# Patient Record
Sex: FEMALE | Race: WHITE | Employment: UNEMPLOYED | URBAN - METROPOLITAN AREA
[De-identification: names, ages, dates, MRNs, and addresses within clinical notes are randomized per-mention and may not be internally consistent; named-entity substitution may affect disease eponyms.]

---

## 2020-08-12 ENCOUNTER — TELEPHONE (OUTPATIENT)
Dept: ENT CLINIC | Age: 50
End: 2020-08-12

## 2021-02-13 ENCOUNTER — HOSPITAL ENCOUNTER (EMERGENCY)
Age: 51
Discharge: HOME | End: 2021-02-13
Payer: COMMERCIAL

## 2021-02-13 ENCOUNTER — APPOINTMENT (OUTPATIENT)
Dept: GENERAL RADIOLOGY | Age: 51
End: 2021-02-13
Payer: COMMERCIAL

## 2021-02-13 VITALS
RESPIRATION RATE: 14 BRPM | OXYGEN SATURATION: 100 % | TEMPERATURE: 98.7 F | DIASTOLIC BLOOD PRESSURE: 85 MMHG | HEIGHT: 66 IN | HEART RATE: 76 BPM | BODY MASS INDEX: 38.57 KG/M2 | WEIGHT: 240 LBS | SYSTOLIC BLOOD PRESSURE: 151 MMHG

## 2021-02-13 DIAGNOSIS — M25.471 RIGHT ANKLE SWELLING: Primary | ICD-10-CM

## 2021-02-13 LAB
A/G RATIO: 1.3 (ref 1.1–2.2)
ALBUMIN SERPL-MCNC: 4 G/DL (ref 3.4–5)
ALP BLD-CCNC: 124 U/L (ref 40–129)
ALT SERPL-CCNC: 33 U/L (ref 10–40)
ANION GAP SERPL CALCULATED.3IONS-SCNC: 7 MMOL/L (ref 3–16)
AST SERPL-CCNC: 30 U/L (ref 15–37)
BASOPHILS ABSOLUTE: 0 K/UL (ref 0–0.2)
BASOPHILS RELATIVE PERCENT: 0.3 %
BILIRUB SERPL-MCNC: 0.5 MG/DL (ref 0–1)
BUN BLDV-MCNC: 18 MG/DL (ref 7–20)
CALCIUM SERPL-MCNC: 9.2 MG/DL (ref 8.3–10.6)
CHLORIDE BLD-SCNC: 104 MMOL/L (ref 99–110)
CO2: 27 MMOL/L (ref 21–32)
CREAT SERPL-MCNC: <0.5 MG/DL (ref 0.6–1.1)
D DIMER: 282 NG/ML DDU (ref 0–229)
EOSINOPHILS ABSOLUTE: 0.3 K/UL (ref 0–0.6)
EOSINOPHILS RELATIVE PERCENT: 3.9 %
GFR AFRICAN AMERICAN: >60
GFR NON-AFRICAN AMERICAN: >60
GLOBULIN: 3.1 G/DL
GLUCOSE BLD-MCNC: 88 MG/DL (ref 70–99)
HCT VFR BLD CALC: 36.9 % (ref 36–48)
HEMOGLOBIN: 12.2 G/DL (ref 12–16)
LYMPHOCYTES ABSOLUTE: 1.4 K/UL (ref 1–5.1)
LYMPHOCYTES RELATIVE PERCENT: 20.2 %
MCH RBC QN AUTO: 27.4 PG (ref 26–34)
MCHC RBC AUTO-ENTMCNC: 33 G/DL (ref 31–36)
MCV RBC AUTO: 83.2 FL (ref 80–100)
MONOCYTES ABSOLUTE: 0.5 K/UL (ref 0–1.3)
MONOCYTES RELATIVE PERCENT: 6.9 %
NEUTROPHILS ABSOLUTE: 4.6 K/UL (ref 1.7–7.7)
NEUTROPHILS RELATIVE PERCENT: 68.7 %
PDW BLD-RTO: 14 % (ref 12.4–15.4)
PLATELET # BLD: 230 K/UL (ref 135–450)
PMV BLD AUTO: 8.1 FL (ref 5–10.5)
POTASSIUM SERPL-SCNC: 4 MMOL/L (ref 3.5–5.1)
RBC # BLD: 4.44 M/UL (ref 4–5.2)
SODIUM BLD-SCNC: 138 MMOL/L (ref 136–145)
TOTAL PROTEIN: 7.1 G/DL (ref 6.4–8.2)
WBC # BLD: 6.8 K/UL (ref 4–11)

## 2021-02-13 PROCEDURE — 80053 COMPREHEN METABOLIC PANEL: CPT

## 2021-02-13 PROCEDURE — 85379 FIBRIN DEGRADATION QUANT: CPT

## 2021-02-13 PROCEDURE — 85025 COMPLETE CBC W/AUTO DIFF WBC: CPT

## 2021-02-13 PROCEDURE — 73610 X-RAY EXAM OF ANKLE: CPT

## 2021-02-13 PROCEDURE — 99284 EMERGENCY DEPT VISIT MOD MDM: CPT

## 2021-02-13 PROCEDURE — 6370000000 HC RX 637 (ALT 250 FOR IP): Performed by: NURSE PRACTITIONER

## 2021-02-13 RX ADMIN — APIXABAN 10 MG: 5 TABLET, FILM COATED ORAL at 20:57

## 2021-02-13 ASSESSMENT — PAIN SCALES - GENERAL: PAINLEVEL_OUTOF10: 6

## 2021-02-15 NOTE — ED PROVIDER NOTES
[address] [place] [address]  ED  EMERGENCY DEPARTMENT ENCOUNTER      This patient was not seen and evaluated by the attending physician. Pt Name: Pankaj Berger  MRN: [id number]  Caitlyngfmaxi 1970  Date of evaluation: 2/13/2021  Provider: KENNA McintyreC  PCP: Referring Not In System (Inactive)      History provided by the patient. CHIEFCOMPLAINT:     Chief Complaint   Patient presents with    Foot Swelling     swelling in right leg and foot per pt; no known injury started yesterday       HISTORY OF PRESENT ILLNESS:      Pankaj Berger is a 45 y.o. female who presents to [address] [place] [address]  ED with complaints of right ankle and foot swelling. Patient states that she noticed it yesterday without injury. States that it is uncomfortable, denies any history of blood clots. She denies any other injuries or complaints, denies chest pain or difficulty breathing. She is here for further evaluation. LOCATION:right ankle  QUALITY:ache  SEVERITY:6  DURATION:yesterday  MODIFYING FACTORS:worse with movement. Nursing Notes were reviewed     REVIEW OF SYSTEMS:     Review of Systems  All systems, a total of 10, are reviewed and negative except for those that were just noted in history present illness.         PAST MEDICAL HISTORY:     Past Medical History:   Diagnosis Date    Arthritis     Asthma     Bronchitis chronic     Cervical cancer ([address]     Diabetes mellitus ([address]     Emphysema of lung ([address]     Fibromyalgia     Hypertension     Insomnia     Lung disease     PAD [facility]peripheral artery disease) [facility]     Pneumonia          SURGICAL HISTORY:      Past Surgical History:   Procedure Laterality Date    ANGIOPLASTY      BRONCHOSCOPY      CHOLECYSTECTOMY      FOOT SURGERY  2002    right foot    TUBAL LIGATION  2004         CURRENT MEDICATIONS:       Discharge Medication List as of 2/13/2021  8:45 PM      CONTINUE these medications which have NOT CHANGED    Details Weight   02/13/21 1734 02/13/21 1734 -- 02/13/21 1721 02/13/21 1734 02/13/21 1721 02/13/21 1734 02/13/21 1734   (!) 152/93 98.7 °F (37.1 °C)  98 17 99 % 5' 6\" (1.676 m) 240 lb (108.9 kg)       Physical Exam    CONSTITUTIONAL: Awake and alert. Cooperative. Well-developed. Well-nourished. Vitals:    02/13/21 1721 02/13/21 1734 02/13/21 1952 02/13/21 2103   BP:  (!) 152/93 (!) 188/90 (!) 151/85   Pulse: 98 81 84 76   Resp:  17 16 14   Temp:  98.7 °F (37.1 °C)     SpO2: 99% 96% 99% 100%   Weight:  240 lb (108.9 kg)     Height:  5' 6\" (1.676 m)       HENT: Normocephalic. Atraumatic. External ears normal, without discharge. TMs clear bilaterally. Nonasal discharge. Oropharynx clear, no erythema. Mucous membranes moist.  EYES: Conjunctiva non-injected, nolid abnormalities noted. No scleral icterus. PERRL. EOM's grossly intact. Anterior chambers clear. NECK: Supple. Normal ROM. No meningismus. No thyroid tenderness or swelling noted. CARDIOVASCULAR: RRR. No Murmer. No carotid bruits. PULMONARY/CHEST WALL: Effort normal. No tachypnea. Lungs clear to ausculation. ABDOMEN: Normal BS. Soft. Nondistended. No tenderness to palpation. No guarding. No hernias noted. No splenomegaly. Back: Spine is midline. No ecchymosis. No crepituson palpation. No obvious subluxation of vertebral column. No saddle anesthesia or evidence of cauda equina. /ANORECTAL: Not assessed  MUSKULOSKELETAL: There is unilateral swelling to the right ankle, its mildly tender without significant erythema or ecchymosis. 2+ dorsalis pedis and posterior tibial pulses are present. No significant right calf tenderness. Other extremities are atraumatic and nontender  SKIN: Warm and dry. NEUROLOGICAL:  GCS 15. CN II-XII grossly intact. Strength is 5/5 in all extremities and sensation is intact. PSYCHIATRIC: Normal affect, normal insight and judgement. Alert and oriented x 3.         DIAGNOSTIC RESULTS:     LABS:    Results for orders placed or performed during the hospital encounter of 02/13/21   CBC auto differential   Result Value Ref Range    WBC 6.8 4.0 - 11.0 K/uL    RBC 4.44 4.00 - 5.20 M/uL    Hemoglobin 12.2 12.0 - 16.0 g/dL    Hematocrit 36.9 36.0 - 48.0 %    MCV 83.2 80.0 - 100.0 fL    MCH 27.4 26.0 - 34.0 pg    MCHC 33.0 31.0 - 36.0 g/dL    RDW 14.0 12.4 - 15.4 %    Platelets 252 114 - 633 K/uL    MPV 8.1 5.0 - 10.5 fL    Neutrophils % 68.7 %    Lymphocytes % 20.2 %    Monocytes % 6.9 %    Eosinophils % 3.9 %    Basophils % 0.3 %    Neutrophils Absolute 4.6 1.7 - 7.7 K/uL    Lymphocytes Absolute 1.4 1.0 - 5.1 K/uL    Monocytes Absolute 0.5 0.0 - 1.3 K/uL    Eosinophils Absolute 0.3 0.0 - 0.6 K/uL    Basophils Absolute 0.0 0.0 - 0.2 K/uL   Comprehensive metabolic panel   Result Value Ref Range    Sodium 138 136 - 145 mmol/L    Potassium 4.0 3.5 - 5.1 mmol/L    Chloride 104 99 - 110 mmol/L    CO2 27 21 - 32 mmol/L    Anion Gap 7 3 - 16    Glucose 88 70 - 99 mg/dL    BUN 18 7 - 20 mg/dL    CREATININE <0.5 (L) 0.6 - 1.1 mg/dL    GFR Non-African American >60 >60    GFR African American >60 >60    Calcium 9.2 8.3 - 10.6 mg/dL    Total Protein 7.1 6.4 - 8.2 g/dL    Albumin 4.0 3.4 - 5.0 g/dL    Albumin/Globulin Ratio 1.3 1.1 - 2.2    Total Bilirubin 0.5 0.0 - 1.0 mg/dL    Alkaline Phosphatase 124 40 - 129 U/L    ALT 33 10 - 40 U/L    AST 30 15 - 37 U/L    Globulin 3.1 g/dL   D-dimer, quantitative   Result Value Ref Range    D-Dimer, Quant 282 (H) 0 - 229 ng/mL DDU         RADIOLOGY:  All x-ray studies are viewed/reviewed by me. Formal interpretations per the radiologist are as follows:      XR ANKLE RIGHT (MIN 3 VIEWS)   Final Result   No acute fracture or dislocation. EKG:  See EKG interpretation by an attending physician.       PROCEDURES:   N/A    CRITICAL CARE TIME:   N/A    CONSULTS:  None      EMERGENCY DEPARTMENT COURSE andDIFFERENTIAL DIAGNOSIS/MDM:   Vitals:    Vitals:    02/13/21 1721 02/13/21 1734 02/13/21 1952 02/13/21 2103 BP:  (!) 152/93 (!) 188/90 (!) 151/85   Pulse: 98 81 84 76   Resp:  17 16 14   Temp:  98.7 °F (37.1 °C)     SpO2: 99% 96% 99% 100%   Weight:  240 lb (108.9 kg)     Height:  5' 6\" (1.676 m)         Patient wasgiven the following medications:  Medications   apixaban (ELIQUIS) tablet 10 mg (10 mg Oral Given 2/13/21 2057)         Patient was evaluated independently by myself with the attending physician available for consultation. Patient presented to the emergency room today with complaints of right ankle pain. Patient pain is unilateral.  No known injury. Patient with no history of a blood clot although I did consider DVT given the unilateral nature. Patient D-dimer was elevated, I did start her on Eliquis, gave her an order for an outpatient ultrasound to evaluate for possible DVT. Patient had no chest pain or difficulty breathing. Patient was discharged home in good condition. Strict return precautions given. Patient laboratory studies, radiographic imaging, and assessment were all discussed with the patient and/orpatient family. There was shared decision-making between myself as well as the patient and/or their surrogate and we are all in agreement with discharge home. There was an opportunity for questions and all questions were answered tothe best of my ability and to the satisfaction of the patient and/or patient family. FINAL IMPRESSION:      1.  Right ankle swelling          DISPOSITION/PLAN:   DISPOSITION Decision To Discharge      PATIENT REFERRED TO:  [facility]  ED  [address] [facility] [address]  Go to   If symptoms worsen    [facility]) Pre-Services  [phone]  Call   For follow up      DISCHARGE MEDICATIONS:  Discharge Medication List as of 2/13/2021  8:45 PM      START taking these medications    Details   apixaban (ELIQUIS) 5 MG TABS tablet Take 2 tablets by mouth twice daily until you have your venous duplex, Disp-28 tablet, R-0Normal (Please note thatportions of this note were completed with a voice recognition program.  Efforts were made to edit the dictations, but occasionally words are mis-transcribed.)    KENNA Stapleton - CNP-C (electronicallysigned)        KENNA Stapleton CNP  02/15/21 5402

## 2021-02-16 ENCOUNTER — VIRTUAL VISIT (OUTPATIENT)
Dept: INTERNAL MEDICINE CLINIC | Age: 51
End: 2021-02-16
Payer: COMMERCIAL

## 2021-02-16 DIAGNOSIS — Z12.11 SCREEN FOR COLON CANCER: ICD-10-CM

## 2021-02-16 DIAGNOSIS — Z00.00 ANNUAL PHYSICAL EXAM: Primary | ICD-10-CM

## 2021-02-16 DIAGNOSIS — M79.604 LEG PAIN, RIGHT: ICD-10-CM

## 2021-02-16 DIAGNOSIS — R60.0 LEG EDEMA, RIGHT: ICD-10-CM

## 2021-02-16 DIAGNOSIS — Z12.31 ENCOUNTER FOR SCREENING MAMMOGRAM FOR MALIGNANT NEOPLASM OF BREAST: ICD-10-CM

## 2021-02-16 PROCEDURE — 99386 PREV VISIT NEW AGE 40-64: CPT | Performed by: INTERNAL MEDICINE

## 2021-02-16 PROCEDURE — 99203 OFFICE O/P NEW LOW 30 MIN: CPT | Performed by: INTERNAL MEDICINE

## 2021-02-16 ASSESSMENT — PATIENT HEALTH QUESTIONNAIRE - PHQ9
2. FEELING DOWN, DEPRESSED OR HOPELESS: 0
SUM OF ALL RESPONSES TO PHQ QUESTIONS 1-9: 0
SUM OF ALL RESPONSES TO PHQ9 QUESTIONS 1 & 2: 0

## 2021-02-16 NOTE — PROGRESS NOTES
[facility]  History and Physical  Evelin Rascon M.D. José Luis Key  YOB: 1970    Date of Service:  2/16/2021    Chief Complaint:   José Luis Key is a 45 y.o. female who presents for   Chief Complaint   Patient presents with   [address] [facility] ED Follow-up     ankle swelling 02/13/2021     Pursuant to the emergency declaration under the [address] [place] [address] [facility] authority and the [employer] Act, this Virtual  Video Visit was conducted, with patient's consent, to reduce the patient's risk of exposure to COVID-19 and provide continuity of care. Service is  provided through a video synchronous discussion virtually to substitute for in-person clinic visit with the patient being at home and Dr. Devika Shine being at home. HPI: Here for Annual Physical and Follow up. Right leg swelling for 3 days and went to the ER with only slight elevation of D dimer 282 and sent home with Eliquis but it was too expensive so she didn't pick it up. She was diagnose with COVID 19 on Jan 22 and have been having some mild ORTIZ since then. No doppler was done in the ER that day.     Lab Results   Component Value Date    LABA1C 5.1 03/08/2010     Lab Results   Component Value Date     02/13/2021    K 4.0 02/13/2021     02/13/2021    CO2 27 02/13/2021    BUN 18 02/13/2021    CREATININE <0.5 (L) 02/13/2021    GLUCOSE 88 02/13/2021    CALCIUM 9.2 02/13/2021     Lab Results   Component Value Date    CHOL 169 05/03/2011    TRIG 150 05/03/2011    HDL 43 05/03/2011    LDLCALC 96 05/03/2011     Lab Results   Component Value Date    ALT 33 02/13/2021    AST 30 02/13/2021     Lab Results   Component Value Date    TSH 1.22 05/03/2011    TSH 0.50 03/08/2010    T4FREE 0.96 05/03/2011    T4FREE 0.96 05/03/2011     Lab Results   Component Value Date    WBC 6.8 02/13/2021    HGB 12.2 02/13/2021 HCT 36.9 02/13/2021    MCV 83.2 02/13/2021     02/13/2021     No results found for: INR   No results found for: PSA   No results found for: LABURIC     Wt Readings from Last 3 Encounters:   02/13/21 240 lb (108.9 kg)   07/06/16 220 lb (99.8 kg)   06/30/16 240 lb (108.9 kg)     BP Readings from Last 3 Encounters:   02/13/21 (!) 151/85   07/06/16 157/89   06/30/16 (!) 160/105       Patient Active Problem List   Diagnosis    Insomnia    COPD (chronic obstructive pulmonary disease) (HCC)       Allergies   Allergen Reactions    Erythromycin Hives and Nausea And Vomiting     Fevers    Azithromycin Nausea And Vomiting     Fever    Flu Virus Vaccine      Get sick    Pneumovax [Pneumococcal Polysaccharide Vaccine]      Get real sick    Shellfish-Derived Products Swelling    Gabapentin Nausea And Vomiting     No outpatient medications have been marked as taking for the 2/16/21 encounter (Virtual Visit) with Victorine Merlin, MD.       Past Medical History:   Diagnosis Date    Arthritis     Asthma     Bronchitis chronic     Cervical cancer ([address]     Diabetes mellitus ([address]     Emphysema of lung ([address]     Fibromyalgia     Hypertension     Insomnia     Lung disease     PAD (peripheral artery disease) ([address]     Pneumonia      Past Surgical History:   Procedure Laterality Date    ANGIOPLASTY  2005    Normal    BRONCHOSCOPY  2012    pneumonia    CHOLECYSTECTOMY  2006    FOOT SURGERY Right 01/01/2002    remove neuroma    TUBAL LIGATION  01/01/2004    UPPER GASTROINTESTINAL ENDOSCOPY  2008    ?  for GERD     Family History   Problem Relation Age of Onset    Asthma Mother     Emphysema Mother     Diabetes Father     Heart Failure Father     Heart Attack Father     Cancer Maternal Grandmother         ovarian     Diabetes Maternal Grandmother     No Known Problems Sister     Diabetes Paternal Grandmother     Heart Attack Paternal Grandmother     Cancer Paternal Grandfather         lung Social History     Socioeconomic History    Marital status:      Spouse name: Not on file    Number of children: Not on file    Years of education: Not on file    Highest education level: Not on file   Occupational History    Not on file   Social Needs    Financial resource strain: Not on file    Food insecurity     Worry: Not on file     Inability: Not on file    Transportation needs     Medical: Not on file     Non-medical: Not on file   Tobacco Use    Smoking status: Former Smoker     Packs/day: 0.50     Types: Cigarettes     Quit date: 3/11/2013     Years since quittin.9    Smokeless tobacco: Never Used   Substance and Sexual Activity    Alcohol use: Yes     Comment: rarely    Drug use: No    Sexual activity: Not on file   Lifestyle    Physical activity     Days per week: Not on file     Minutes per session: Not on file    Stress: Not on file   Relationships    Social connections     Talks on phone: Not on file     Gets together: Not on file     Attends Church service: Not on file     Active member of club or organization: Not on file     Attends meetings of clubs or organizations: Not on file     Relationship status: Not on file    Intimate partner violence     Fear of current or ex partner: Not on file     Emotionally abused: Not on file     Physically abused: Not on file     Forced sexual activity: Not on file   Other Topics Concern    Not on file   Social History Narrative    Not on file       Review of Systems:  A comprehensive review of systems was negative except for what was noted in the HPI. Physical Exam: FULL EXAM TO BE DONE  AT 8:10    Vitals:    21 0818   BP: 132/86   Pulse: 78   Temp: 97.7 °F (36.5 °C)   TempSrc: Infrared   SpO2: 98%   Weight: 261 lb (118.4 kg)   Height: 5' 6\" (1.676 m)     Body mass index is 42.13 kg/m². Constitutional: Patient is oriented to person, place, and time. Patient appears well-developed and well-nourished. No distress. Head: Normocephalic and atraumatic. Neurological: Patient is alert and oriented to person, place, and time. Skin: No rash or erythema. Psychiatric: Patient has a normal mood and affect. Patient's speech is normal and behavior is normal. Judgment, cognition and memory are normal.       Preventive Care:  Health Maintenance   Topic Date Due    Cervical cancer screen  08/20/1990    Lipid screen  05/03/2016    Breast cancer screen  08/20/2019    Shingles Vaccine (1 of 2) 08/20/2019    Colon cancer screen colonoscopy  08/20/2019    DTaP/Tdap/Td vaccine (2 - Td) 01/01/2022    Hepatitis A vaccine  Aged Out    Hepatitis B vaccine  Aged Out    Hib vaccine  Aged Out    Meningococcal (ACWY) vaccine  Aged Out    Hepatitis C screen  Discontinued    HIV screen  Discontinued      Hx abnormal PAP: dysplasia in the past but normal on most recent 10 years ago  Sexual activity: none   Last eye exam: 2019, normal  Exercise: no regular exercise       Preventive plan of care for Bob Peter        2/16/2021           Preventive Measures Status       Recommendations for screening                   Diabetes Screen  Glucose (mg/dL)   Date Value   02/13/2021 88    Repeat yearly   Cholesterol Screen  Lab Results   Component Value Date    CHOL 169 05/03/2011    TRIG 150 (H) 05/03/2011    HDL 43 05/03/2011    LDLCALC 96 05/03/2011    Test recommended and ordered       Weight: There is no height or weight on file to calculate BMI. Your BMI is between 18.5 and 24.9, which indicates that you are at a healthy weight  Your BMI is 25 or greater, which indicates that you are overweight        Recommended Immunizations      There is no immunization history on file for this patient. Influenza vaccine:  recommended yearly, but declined  Pneumonia vaccine: recommended, but declined  Shingles vaccine:   Will check with insurance         Other Recommendations ·   See a dentist every 6 months · Try to get at least 30 minutes of exercise 3-5 days per week  · Always wear a seat belt when traveling in a car  · Always wear a helmet when riding a bicycle or motorcycle  · When exposed to the sun, use a sunscreen that protects against both UVA and UVB radiation with an SPF of 30 or greater- reapply every 2 to 3 hours or after sweating, drying off with a towel, or swimming  · You need 1019-5634 mg of calcium and 1452-1177 IU of vitamin D per day- it is possible to meet your calcium requirement with diet alone, but a vitamin D supplement is usually necessary                 Assessment/Plan:    Mely Nguyễn was seen today for establish care and ed follow-up. Diagnoses and all orders for this visit:    Annual physical exam  -     [facility] Gynecology  -     Lipid Panel; Future    Screen for colon cancer  -     [facility] Gastroenterology    Encounter for screening mammogram for malignant neoplasm of breast  -     ABHI DIGITAL SCREENING AUGMENTED BILATERAL; Future    Leg edema, right  -     US DUP LOWER EXTREMITY RIGHT NEWTON; Future    Leg pain, right  -     US DUP LOWER EXTREMITY RIGHT [place];  Future        Return Feb 16 7:30 Fasting Physical.

## 2021-02-17 ENCOUNTER — HOSPITAL ENCOUNTER (OUTPATIENT)
Dept: VASCULAR LAB | Age: 51
Discharge: HOME | End: 2021-02-17
Payer: COMMERCIAL

## 2021-02-17 ENCOUNTER — HOSPITAL ENCOUNTER (OUTPATIENT)
Age: 51
Discharge: HOME | End: 2021-02-17
Payer: COMMERCIAL

## 2021-02-17 ENCOUNTER — TELEPHONE (OUTPATIENT)
Dept: INTERNAL MEDICINE CLINIC | Age: 51
End: 2021-02-17

## 2021-02-17 ENCOUNTER — NURSE ONLY (OUTPATIENT)
Dept: INTERNAL MEDICINE CLINIC | Age: 51
End: 2021-02-17

## 2021-02-17 VITALS
WEIGHT: 261 LBS | BODY MASS INDEX: 41.95 KG/M2 | HEART RATE: 78 BPM | SYSTOLIC BLOOD PRESSURE: 132 MMHG | DIASTOLIC BLOOD PRESSURE: 86 MMHG | HEIGHT: 66 IN | OXYGEN SATURATION: 98 % | TEMPERATURE: 97.7 F

## 2021-02-17 DIAGNOSIS — M79.604 LEG PAIN, RIGHT: ICD-10-CM

## 2021-02-17 DIAGNOSIS — R60.0 LEG EDEMA, RIGHT: ICD-10-CM

## 2021-02-17 DIAGNOSIS — Z00.00 ANNUAL PHYSICAL EXAM: ICD-10-CM

## 2021-02-17 LAB
CHOLESTEROL, TOTAL: 199 MG/DL (ref 0–199)
HDLC SERPL-MCNC: 47 MG/DL (ref 40–60)
LDL CHOLESTEROL CALCULATED: 119 MG/DL
TRIGL SERPL-MCNC: 165 MG/DL (ref 0–150)
VLDLC SERPL CALC-MCNC: 33 MG/DL

## 2021-02-17 PROCEDURE — 36415 COLL VENOUS BLD VENIPUNCTURE: CPT

## 2021-02-17 PROCEDURE — 80061 LIPID PANEL: CPT

## 2021-02-17 PROCEDURE — 93971 EXTREMITY STUDY: CPT

## 2021-02-17 NOTE — TELEPHONE ENCOUNTER
Inform pt that test did not show a clot and it doesn't look like cellulitis either so if a doctor thought she had cellulitis, that doctor can prescribe her antibiotic since that's not my diagnosis. She can see a dermatologist for her leg pain since that nodule that's been painful may be due to a skin condition and not an infection.   Dermatologist: Dr. Dillard Persons [phone] [facility] [phone]   or Dr Mcnamara [facility] [phone]

## 2021-02-24 ENCOUNTER — TELEPHONE (OUTPATIENT)
Dept: GASTROENTEROLOGY | Age: 51
End: 2021-02-24

## 2021-02-24 NOTE — TELEPHONE ENCOUNTER
Spoke with pt who stated she is not taking Eliquis and has never taken it. Pt also stated she is not going to have a colon right now and will call when she wants it.

## 2021-10-14 ENCOUNTER — OFFICE VISIT (OUTPATIENT)
Dept: FAMILY MEDICINE CLINIC | Age: 51
End: 2021-10-14
Payer: COMMERCIAL

## 2021-10-14 VITALS
WEIGHT: 279 LBS | HEART RATE: 84 BPM | BODY MASS INDEX: 45.03 KG/M2 | DIASTOLIC BLOOD PRESSURE: 74 MMHG | TEMPERATURE: 96.8 F | OXYGEN SATURATION: 96 % | SYSTOLIC BLOOD PRESSURE: 122 MMHG

## 2021-10-14 DIAGNOSIS — L40.0 PLAQUE PSORIASIS: ICD-10-CM

## 2021-10-14 DIAGNOSIS — Z76.89 ENCOUNTER TO ESTABLISH CARE: Primary | ICD-10-CM

## 2021-10-14 DIAGNOSIS — E11.9 TYPE 2 DIABETES MELLITUS WITHOUT COMPLICATION, WITHOUT LONG-TERM CURRENT USE OF INSULIN (HCC): ICD-10-CM

## 2021-10-14 DIAGNOSIS — Z12.31 ENCOUNTER FOR SCREENING MAMMOGRAM FOR MALIGNANT NEOPLASM OF BREAST: ICD-10-CM

## 2021-10-14 DIAGNOSIS — J44.9 CHRONIC OBSTRUCTIVE PULMONARY DISEASE, UNSPECIFIED COPD TYPE (HCC): ICD-10-CM

## 2021-10-14 PROCEDURE — 99214 OFFICE O/P EST MOD 30 MIN: CPT | Performed by: NURSE PRACTITIONER

## 2021-10-14 RX ORDER — IPRATROPIUM BROMIDE AND ALBUTEROL SULFATE 2.5; .5 MG/3ML; MG/3ML
1 SOLUTION RESPIRATORY (INHALATION) EVERY 4 HOURS
Qty: 360 ML | Refills: 3 | Status: SHIPPED | OUTPATIENT
Start: 2021-10-14

## 2021-10-14 RX ORDER — PHENTERMINE HYDROCHLORIDE 37.5 MG/1
37.5 TABLET ORAL
Qty: 30 TABLET | Refills: 0 | Status: SHIPPED | OUTPATIENT
Start: 2021-10-14 | End: 2021-11-13

## 2021-10-14 RX ORDER — ALBUTEROL SULFATE 90 UG/1
2 AEROSOL, METERED RESPIRATORY (INHALATION) 4 TIMES DAILY PRN
Qty: 54 G | Refills: 1 | Status: SHIPPED | OUTPATIENT
Start: 2021-10-14 | End: 2022-08-11 | Stop reason: SDUPTHER

## 2021-10-14 ASSESSMENT — PATIENT HEALTH QUESTIONNAIRE - PHQ9
SUM OF ALL RESPONSES TO PHQ QUESTIONS 1-9: 0
2. FEELING DOWN, DEPRESSED OR HOPELESS: 0
SUM OF ALL RESPONSES TO PHQ9 QUESTIONS 1 & 2: 0
SUM OF ALL RESPONSES TO PHQ QUESTIONS 1-9: 0
1. LITTLE INTEREST OR PLEASURE IN DOING THINGS: 0
SUM OF ALL RESPONSES TO PHQ QUESTIONS 1-9: 0

## 2021-10-14 ASSESSMENT — ENCOUNTER SYMPTOMS
WHEEZING: 1
GASTROINTESTINAL NEGATIVE: 1
SHORTNESS OF BREATH: 1

## 2021-10-14 NOTE — LETTER
[facility] [address]  Phone: [phone]  Fax: [phone]    KENNA Allred CNP        October 14, 2021      Eliott Siemens has a history of COPD. Please allow her to wear the face shield while at work to help her breathing.        Sincerely,        KENNA Allred - CNP

## 2021-10-14 NOTE — PROGRESS NOTES
Patient: Timur Moser is a 45 y.o. female who presents today with the following Chief Complaint(s):  Chief Complaint   Patient presents with    New Patient    Shortness of Breath     since covid in feburary       Assessment:  Encounter Diagnoses   Name Primary?  Encounter to establish care Yes    Chronic obstructive pulmonary disease, unspecified COPD type ([facility]     Plaque psoriasis     BMI 45.0-49.9, adult ([facility]     Type 2 diabetes mellitus without complication, without long-term current use of insulin ([facility]     Encounter for screening mammogram for malignant neoplasm of breast        Plan:  1. Encounter to establish care  See below    2. Chronic obstructive pulmonary disease, unspecified COPD type ([facility]  Will start on Advair inhaler BID and albuterol inhaler. Also given refill of nebulizer solution. 3. Plaque psoriasis  Will refer to Derm. Patient wants to consider otezla. - AFL - Reyna Agrawal DO, Dermatology, [facility]    4. BMI 45.0-49.9, adult ([facility]  Will start Adipex daily. Side effects and uses discussed. Patient verbalized her understanding. I also gave referral to Dr. Mandie Melgar for weight loss. - Comprehensive Metabolic Panel  - TSH WITH REFLEX TO FT4  - Ivelisse Okeefe MD, Bariatric Surgery, [facility]  - phentermine (ADIPEX-P) 37.5 MG tablet; Take 1 tablet by mouth every morning (before breakfast) for 30 days. Dispense: 30 tablet; Refill: 0    5. Type 2 diabetes mellitus without complication, without long-term current use of insulin ([facility]  Patient reports that her diabetes had resolved. Since she has regained some of her weight I will recheck her A1c today. And also check her thyroid. - Hemoglobin A1C  - TSH WITH REFLEX TO FT4    6. Encounter for screening mammogram for malignant neoplasm of breast  - ABHI DIGITAL SCREEN W OR WO CAD BILATERAL; Future      HPI   Patient presents today to establish care. She has concerns today of weight gain and shortness of breath. She states her breathing has been like this since she had covid in February. She has a history of smoking and was diagnosed with COPD in 2010. Patient states she has gone through menopause and reports no period for the past year. She has had trouble losing weigh since then. She is also stressed and working 2 jobs. She states she gets up at 3 in the morning to be at work by 4. She does feel fatigued. Regarding her weight, she has gone to [facility] in [place] in the past and been on Adipex. She states the first time around she did well with it the second time around she states she went to a cheaper clinic and she states the medication was not as effective then. She would be interested in restarting the medication to see if it would help this time. Patient was advised that in [address] [place] [address] Dr viramontes can only do it for 3 months at a time. Patient verbalized understanding. She also has a history of plaque psoriasis. Patient is currently using something over-the-counter to help keep it calm down but she still has redness and rash on bilateral forearms and legs. She also has a history of type 2 diabetes. Patient states she did lose a lot of weight in the past and that diabetes was not an issue anymore. Current Outpatient Medications   Medication Sig Dispense Refill    phentermine (ADIPEX-P) 37.5 MG tablet Take 1 tablet by mouth every morning (before breakfast) for 30 days. 30 tablet 0    albuterol sulfate HFA (VENTOLIN HFA) 108 (90 Base) MCG/ACT inhaler Inhale 2 puffs into the lungs 4 times daily as needed for Wheezing 54 g 1    fluticasone-salmeterol (ADVAIR DISKUS) 250-50 MCG/DOSE AEPB Inhale 1 puff into the lungs every 12 hours 60 each 2    ipratropium-albuterol (DUONEB) 0.5-2.5 (3) MG/3ML SOLN nebulizer solution Inhale 3 mLs into the lungs every 4 hours 360 mL 3     No current facility-administered medications for this visit.        Patient's past medical history, surgical history, family history, medications,and allergies  were all reviewed and updated as appropriate today. Review of Systems   Constitutional: Positive for fatigue and unexpected weight change. HENT: Negative. Respiratory: Positive for shortness of breath and wheezing. Cardiovascular: Negative. Gastrointestinal: Negative. Skin: Positive for rash. Neurological: Negative. Psychiatric/Behavioral: Negative. Physical Exam  Vitals reviewed. Cardiovascular:      Rate and Rhythm: Normal rate and regular rhythm. Heart sounds: Normal heart sounds. Pulmonary:      Breath sounds: Decreased air movement present. Examination of the right-upper field reveals wheezing. Examination of the left-upper field reveals wheezing. Examination of the right-middle field reveals wheezing. Examination of the left-middle field reveals wheezing. Wheezing present. Skin:     General: Skin is warm and dry. Neurological:      Mental Status: She is alert and oriented to person, place, and time. Psychiatric:         Mood and Affect: Mood normal.         Behavior: Behavior normal.       Vitals:    10/14/21 1252   BP: 122/74   Pulse: 84   Temp: 96.8 °F (36 °C)   SpO2: 96%       This chart was generated using the Dragon dictation system. I created this record but it may contain dictation errors due to the limitation of the software.

## 2021-10-15 LAB
A/G RATIO: 1.3 (ref 1.1–2.2)
ALBUMIN SERPL-MCNC: 4.3 G/DL (ref 3.4–5)
ALP BLD-CCNC: 134 U/L (ref 40–129)
ALT SERPL-CCNC: 18 U/L (ref 10–40)
ANION GAP SERPL CALCULATED.3IONS-SCNC: 15 MMOL/L (ref 3–16)
AST SERPL-CCNC: 19 U/L (ref 15–37)
BILIRUB SERPL-MCNC: 0.3 MG/DL (ref 0–1)
BUN BLDV-MCNC: 17 MG/DL (ref 7–20)
CALCIUM SERPL-MCNC: 9.6 MG/DL (ref 8.3–10.6)
CHLORIDE BLD-SCNC: 102 MMOL/L (ref 99–110)
CO2: 23 MMOL/L (ref 21–32)
CREAT SERPL-MCNC: <0.5 MG/DL (ref 0.6–1.1)
ESTIMATED AVERAGE GLUCOSE: 105.4 MG/DL
GFR AFRICAN AMERICAN: >60
GFR NON-AFRICAN AMERICAN: >60
GLOBULIN: 3.2 G/DL
GLUCOSE BLD-MCNC: 84 MG/DL (ref 70–99)
HBA1C MFR BLD: 5.3 %
POTASSIUM SERPL-SCNC: 4.5 MMOL/L (ref 3.5–5.1)
SODIUM BLD-SCNC: 140 MMOL/L (ref 136–145)
TOTAL PROTEIN: 7.5 G/DL (ref 6.4–8.2)
TSH REFLEX FT4: 1.73 UIU/ML (ref 0.27–4.2)

## 2021-10-30 ENCOUNTER — APPOINTMENT (OUTPATIENT)
Dept: GENERAL RADIOLOGY | Age: 51
End: 2021-10-30
Payer: COMMERCIAL

## 2021-10-30 ENCOUNTER — HOSPITAL ENCOUNTER (EMERGENCY)
Age: 51
Discharge: HOME | End: 2021-10-30
Attending: EMERGENCY MEDICINE
Payer: COMMERCIAL

## 2021-10-30 VITALS
BODY MASS INDEX: 46.48 KG/M2 | DIASTOLIC BLOOD PRESSURE: 80 MMHG | HEART RATE: 79 BPM | HEIGHT: 65 IN | OXYGEN SATURATION: 98 % | TEMPERATURE: 97.3 F | RESPIRATION RATE: 18 BRPM | WEIGHT: 279 LBS | SYSTOLIC BLOOD PRESSURE: 134 MMHG

## 2021-10-30 DIAGNOSIS — R14.0 ABDOMINAL BLOATING: ICD-10-CM

## 2021-10-30 DIAGNOSIS — R07.9 CHEST PAIN, UNSPECIFIED TYPE: Primary | ICD-10-CM

## 2021-10-30 LAB
A/G RATIO: 1.4 (ref 1.1–2.2)
ALBUMIN SERPL-MCNC: 4.4 G/DL (ref 3.4–5)
ALP BLD-CCNC: 122 U/L (ref 40–129)
ALT SERPL-CCNC: 16 U/L (ref 10–40)
ANION GAP SERPL CALCULATED.3IONS-SCNC: 9 MMOL/L (ref 3–16)
AST SERPL-CCNC: 13 U/L (ref 15–37)
BASOPHILS ABSOLUTE: 0.2 K/UL (ref 0–0.2)
BASOPHILS RELATIVE PERCENT: 1.9 %
BILIRUB SERPL-MCNC: 0.3 MG/DL (ref 0–1)
BUN BLDV-MCNC: 15 MG/DL (ref 7–20)
CALCIUM SERPL-MCNC: 9.2 MG/DL (ref 8.3–10.6)
CHLORIDE BLD-SCNC: 101 MMOL/L (ref 99–110)
CO2: 28 MMOL/L (ref 21–32)
CREAT SERPL-MCNC: <0.5 MG/DL (ref 0.6–1.1)
EKG ATRIAL RATE: 85 BPM
EKG DIAGNOSIS: NORMAL
EKG P AXIS: 47 DEGREES
EKG P-R INTERVAL: 156 MS
EKG Q-T INTERVAL: 358 MS
EKG QRS DURATION: 70 MS
EKG QTC CALCULATION (BAZETT): 426 MS
EKG R AXIS: 53 DEGREES
EKG T AXIS: 59 DEGREES
EKG VENTRICULAR RATE: 85 BPM
EOSINOPHILS ABSOLUTE: 0.1 K/UL (ref 0–0.6)
EOSINOPHILS RELATIVE PERCENT: 1.4 %
GFR AFRICAN AMERICAN: >60
GFR NON-AFRICAN AMERICAN: >60
GLUCOSE BLD-MCNC: 108 MG/DL (ref 70–99)
HCT VFR BLD CALC: 40.9 % (ref 36–48)
HEMOGLOBIN: 13.7 G/DL (ref 12–16)
LIPASE: 29 U/L (ref 13–60)
LYMPHOCYTES ABSOLUTE: 1.3 K/UL (ref 1–5.1)
LYMPHOCYTES RELATIVE PERCENT: 15.4 %
MCH RBC QN AUTO: 27.8 PG (ref 26–34)
MCHC RBC AUTO-ENTMCNC: 33.5 G/DL (ref 31–36)
MCV RBC AUTO: 83.1 FL (ref 80–100)
MONOCYTES ABSOLUTE: 0.5 K/UL (ref 0–1.3)
MONOCYTES RELATIVE PERCENT: 6.3 %
NEUTROPHILS ABSOLUTE: 6.4 K/UL (ref 1.7–7.7)
NEUTROPHILS RELATIVE PERCENT: 75 %
PDW BLD-RTO: 14 % (ref 12.4–15.4)
PLATELET # BLD: 221 K/UL (ref 135–450)
PMV BLD AUTO: 8.8 FL (ref 5–10.5)
POTASSIUM REFLEX MAGNESIUM: 4 MMOL/L (ref 3.5–5.1)
RBC # BLD: 4.93 M/UL (ref 4–5.2)
SODIUM BLD-SCNC: 138 MMOL/L (ref 136–145)
TOTAL PROTEIN: 7.6 G/DL (ref 6.4–8.2)
TROPONIN: <0.01 NG/ML
WBC # BLD: 8.6 K/UL (ref 4–11)

## 2021-10-30 PROCEDURE — 93005 ELECTROCARDIOGRAM TRACING: CPT | Performed by: EMERGENCY MEDICINE

## 2021-10-30 PROCEDURE — 83690 ASSAY OF LIPASE: CPT

## 2021-10-30 PROCEDURE — 85025 COMPLETE CBC W/AUTO DIFF WBC: CPT

## 2021-10-30 PROCEDURE — 99285 EMERGENCY DEPT VISIT HI MDM: CPT

## 2021-10-30 PROCEDURE — 80053 COMPREHEN METABOLIC PANEL: CPT

## 2021-10-30 PROCEDURE — 84484 ASSAY OF TROPONIN QUANT: CPT

## 2021-10-30 PROCEDURE — 71045 X-RAY EXAM CHEST 1 VIEW: CPT

## 2021-10-30 PROCEDURE — 93010 ELECTROCARDIOGRAM REPORT: CPT | Performed by: INTERNAL MEDICINE

## 2021-10-30 PROCEDURE — 6370000000 HC RX 637 (ALT 250 FOR IP): Performed by: EMERGENCY MEDICINE

## 2021-10-30 RX ORDER — PANTOPRAZOLE SODIUM 40 MG/1
40 TABLET, DELAYED RELEASE ORAL
Qty: 30 TABLET | Refills: 0 | Status: SHIPPED | OUTPATIENT
Start: 2021-10-30 | End: 2022-08-11 | Stop reason: SDUPTHER

## 2021-10-30 RX ORDER — SUCRALFATE ORAL 1 G/10ML
1 SUSPENSION ORAL 4 TIMES DAILY
Qty: 400 ML | Refills: 0 | Status: SHIPPED | OUTPATIENT
Start: 2021-10-30 | End: 2021-10-30 | Stop reason: SDUPTHER

## 2021-10-30 RX ORDER — SUCRALFATE ORAL 1 G/10ML
1 SUSPENSION ORAL 4 TIMES DAILY
Qty: 400 ML | Refills: 0 | Status: SHIPPED | OUTPATIENT
Start: 2021-10-30 | End: 2021-11-15

## 2021-10-30 RX ORDER — PANTOPRAZOLE SODIUM 40 MG/1
40 TABLET, DELAYED RELEASE ORAL
Qty: 30 TABLET | Refills: 0 | Status: SHIPPED | OUTPATIENT
Start: 2021-10-30 | End: 2021-10-30 | Stop reason: SDUPTHER

## 2021-10-30 RX ADMIN — LIDOCAINE HYDROCHLORIDE: 20 SOLUTION ORAL; TOPICAL at 14:39

## 2021-10-30 ASSESSMENT — HEART SCORE: ECG: 0

## 2021-10-30 ASSESSMENT — PAIN SCALES - GENERAL
PAINLEVEL_OUTOF10: 3
PAINLEVEL_OUTOF10: 6

## 2021-10-30 NOTE — ED PROVIDER NOTES
[address] ED      CHIEF COMPLAINT  Chest Pain (Midsternal CP and SOB with cough started this am no cardiac hx.  + hx of asthma.  )       HISTORY OF PRESENT ILLNESS  Kavitha Hatch is a 45 y.o. female  who presents to the ED complaining of chest pain when she woke up at 4am.  Is constant but better with sitting. Worse with laying flat. Ate some popcorn yesterday and that has made sxs like this before but never this bad. Went to go help a friend and the sxs continued so she came to the ER. Thought she was constipated as she goes a little bit then still feels like she has to go.  + dyspnea. Stomach was bloated before with gas with similar sxs and took some \"gas\" medicine and it improved. Has not taken anything currently for the sxs other than tried some pepto this AM without improvement. No vomiting. Slight cough. + hx asthma. Had Kaya in February. Is now vaccinated against COVID. No fevers. No lightheadedness. No other complaints, modifying factors or associated symptoms. I have reviewed the following from the nursing documentation. Past Medical History:   Diagnosis Date    Arthritis     Asthma     Bronchitis chronic     Cervical cancer ([address]     Diabetes mellitus ([address]     Emphysema of lung ([address]     Fibromyalgia     Hypertension     Insomnia     Lung disease     PAD (peripheral artery disease) ([address]     Pneumonia      Past Surgical History:   Procedure Laterality Date    ANGIOPLASTY  2005    Normal    BRONCHOSCOPY  2012    pneumonia    CHOLECYSTECTOMY  2006    FOOT SURGERY Right 01/01/2002    remove neuroma    TUBAL LIGATION  01/01/2004    UPPER GASTROINTESTINAL ENDOSCOPY  2008    ?  for GERD     Family History   Problem Relation Age of Onset    Emphysema Mother     High Blood Pressure Mother     Diabetes Father     Heart Failure Father     Heart Attack Father 61    High Blood Pressure Father     High Cholesterol Father     Neuropathy Father    Verl Raw Cancer Maternal Grandmother         ovarian     Diabetes Maternal Grandmother     Early Death Maternal Grandfather         murdered    Substance Abuse Sister         Heroin, meth,..    Diabetes Paternal Grandmother     Heart Attack Paternal Grandmother 62    Lung Cancer Paternal Grandfather     Heart Disease Maternal Aunt     Other Maternal Aunt         aortic dissection    Diabetes Maternal Uncle     Lung Cancer Maternal Uncle     Lung Cancer Maternal Uncle      Social History     Socioeconomic History    Marital status:      Spouse name: Not on file    Number of children: Not on file    Years of education: Not on file    Highest education level: Not on file   Occupational History    Not on file   Tobacco Use    Smoking status: Former Smoker     Packs/day: 0.50     Years: 20.00     Pack years: 10.00     Types: Cigarettes     Quit date: 3/11/2013     Years since quittin.6    Smokeless tobacco: Never Used   Vaping Use    Vaping Use: Never used   Substance and Sexual Activity    Alcohol use: Yes     Comment: rarely    Drug use: No    Sexual activity: Not Currently   Other Topics Concern    Not on file   Social History Narrative    Not on file     Social Determinants of Health     Financial Resource Strain:     Difficulty of Paying Living Expenses:    Food Insecurity:     Worried About Running Out of Food in the Last Year:     Ran Out of Food in the Last Year:    Transportation Needs:     Lack of Transportation (Medical):      Lack of Transportation (Non-Medical):    Physical Activity:     Days of Exercise per Week:     Minutes of Exercise per Session:    Stress:     Feeling of Stress :    Social Connections:     Frequency of Communication with Friends and Family:     Frequency of Social Gatherings with Friends and Family:     Attends Druze Services:     Active Member of Clubs or Organizations:     Attends Club or Organization Meetings:     Marital Status: Intimate Partner Violence:     Fear of Current or Ex-Partner:     Emotionally Abused:     Physically Abused:     Sexually Abused:      No current facility-administered medications for this encounter. Current Outpatient Medications   Medication Sig Dispense Refill    pantoprazole (PROTONIX) 40 MG tablet Take 1 tablet by mouth every morning (before breakfast) 30 tablet 0    sucralfate (CARAFATE) 1 GM/10ML suspension Take 10 mLs by mouth 4 times daily for 10 days 400 mL 0    phentermine (ADIPEX-P) 37.5 MG tablet Take 1 tablet by mouth every morning (before breakfast) for 30 days. 30 tablet 0    albuterol sulfate HFA (VENTOLIN HFA) 108 (90 Base) MCG/ACT inhaler Inhale 2 puffs into the lungs 4 times daily as needed for Wheezing 54 g 1    fluticasone-salmeterol (ADVAIR DISKUS) 250-50 MCG/DOSE AEPB Inhale 1 puff into the lungs every 12 hours 60 each 2    ipratropium-albuterol (DUONEB) 0.5-2.5 (3) MG/3ML SOLN nebulizer solution Inhale 3 mLs into the lungs every 4 hours 360 mL 3     Allergies   Allergen Reactions    Erythromycin Hives and Nausea And Vomiting     Fevers    Azithromycin Nausea And Vomiting     Fever    Flu Virus Vaccine      Get sick    Pneumovax [Pneumococcal Polysaccharide Vaccine]      Get real sick    Shellfish-Derived Products Swelling    Gabapentin Nausea And Vomiting       REVIEW OF SYSTEMS  10 systems reviewed, pertinent positives per HPI otherwise noted to be negative. PHYSICAL EXAM  /80   Pulse 79   Temp 97.3 °F (36.3 °C) (Oral)   Resp 18   Ht 5' 5\" (1.651 m)   Wt 279 lb (126.6 kg)   SpO2 98%   BMI 46.43 kg/m²    GENERAL APPEARANCE: Awake and alert. Cooperative. No acute distress. Obese. HENT: Normocephalic. Atraumatic. Mucous membranes are moist.  No drooling or stridor. NECK: Supple. EYES: PERRL. EOM's grossly intact. HEART/CHEST: RRR. No murmurs. 2+ radial pulses b/l. LUNGS: Respirations unlabored. CTAB. Good air exchange.  Speaking comfortably in full sentences. ABDOMEN: No tenderness. Soft. Non-distended. No masses. No organomegaly. No guarding or rebound. MUSCULOSKELETAL: No extremity edema. Compartments soft. No deformity. No tenderness in the extremities. All extremities neurovascularly intact. SKIN: Warm and dry. No acute rashes. NEUROLOGICAL: Alert and oriented. CN's 2-12 intact. No gross facial drooping. Strength 5/5, sensation intact. No gross focal deficits. PSYCHIATRIC: Normal mood and affect. LABS  I have reviewed all labs for this visit.    Results for orders placed or performed during the hospital encounter of 10/30/21   CBC Auto Differential   Result Value Ref Range    WBC 8.6 4.0 - 11.0 K/uL    RBC 4.93 4.00 - 5.20 M/uL    Hemoglobin 13.7 12.0 - 16.0 g/dL    Hematocrit 40.9 36.0 - 48.0 %    MCV 83.1 80.0 - 100.0 fL    MCH 27.8 26.0 - 34.0 pg    MCHC 33.5 31.0 - 36.0 g/dL    RDW 14.0 12.4 - 15.4 %    Platelets 857 506 - 248 K/uL    MPV 8.8 5.0 - 10.5 fL    Neutrophils % 75.0 %    Lymphocytes % 15.4 %    Monocytes % 6.3 %    Eosinophils % 1.4 %    Basophils % 1.9 %    Neutrophils Absolute 6.4 1.7 - 7.7 K/uL    Lymphocytes Absolute 1.3 1.0 - 5.1 K/uL    Monocytes Absolute 0.5 0.0 - 1.3 K/uL    Eosinophils Absolute 0.1 0.0 - 0.6 K/uL    Basophils Absolute 0.2 0.0 - 0.2 K/uL   Comprehensive Metabolic Panel w/ Reflex to MG   Result Value Ref Range    Sodium 138 136 - 145 mmol/L    Potassium reflex Magnesium 4.0 3.5 - 5.1 mmol/L    Chloride 101 99 - 110 mmol/L    CO2 28 21 - 32 mmol/L    Anion Gap 9 3 - 16    Glucose 108 (H) 70 - 99 mg/dL    BUN 15 7 - 20 mg/dL    CREATININE <0.5 (L) 0.6 - 1.1 mg/dL    GFR Non-African American >60 >60    GFR African American >60 >60    Calcium 9.2 8.3 - 10.6 mg/dL    Total Protein 7.6 6.4 - 8.2 g/dL    Albumin 4.4 3.4 - 5.0 g/dL    Albumin/Globulin Ratio 1.4 1.1 - 2.2    Total Bilirubin 0.3 0.0 - 1.0 mg/dL    Alkaline Phosphatase 122 40 - 129 U/L    ALT 16 10 - 40 U/L    AST 13 (L) 15 - 37 U/L   Troponin Result Value Ref Range    Troponin <0.01 <0.01 ng/mL   Lipase   Result Value Ref Range    Lipase 29.0 13.0 - 60.0 U/L   EKG 12 Lead   Result Value Ref Range    Ventricular Rate 85 BPM    Atrial Rate 85 BPM    P-R Interval 156 ms    QRS Duration 70 ms    Q-T Interval 358 ms    QTc Calculation (Bazett) 426 ms    P Axis 47 degrees    R Axis 53 degrees    T Axis 59 degrees    Diagnosis       Normal sinus rhythmNormal ECGNo previous ECGs availableConfirmed by ANG HERNÁNDEZ MD ([id number]) on 10/30/2021 2:09:45 PM       ECG  The Ekg interpreted by me shows  normal sinus rhythm with a rate of 85  Axis is   Normal  QTc is  normal  Intervals and Durations are unremarkable. ST Segments: no acute change  No prior ECG available for comparison. RADIOLOGY  XR CHEST PORTABLE    Result Date: 10/30/2021  EXAMINATION: ONE XRAY VIEW OF THE CHEST 10/30/2021 1:22 pm COMPARISON: 04/06/2014 HISTORY: ORDERING SYSTEM PROVIDED HISTORY: chest pain TECHNOLOGIST PROVIDED HISTORY: Reason for exam:->chest pain Reason for Exam: chest pain Acuity: Acute Type of Exam: Initial FINDINGS: The lungs are without acute focal process. There is no effusion or pneumothorax. The cardiomediastinal silhouette is stable. The osseous structures are stable. No acute process. ED COURSE/MDM  Patient seen and evaluated. Old records reviewed. Labs and imaging reviewed and results discussed with patient. By history symptoms seem much more GI related but certainly given age and risk factors I did consider possible cardiac cause and obtained a EKG which showed no acute ischemic changes and troponin was negative. GI cocktail improved symptoms. At this time, I felt that is reasonable to discharge patient home with a PPI, Carafate and close follow-up. Patient verbalized understanding and agreement of plan and all questions answered prior to discharge.     I estimate there is LOW risk for PULMONARY EMBOLISM, ACUTE CORONARY SYNDROME, OR THORACIC AORTIC DISSECTION, thus I consider the discharge disposition reasonable. Shefali Mendoza and I have discussed the diagnosis and risks, and we agree with discharging home to follow-up with their primary doctor. We also discussed returning to the Emergency Department immediately if new or worsening symptoms occur. We have discussed the symptoms which are most concerning (e.g., bloody sputum, fever, worsening pain or shortness of breath, vomiting) that necessitate immediate return. During the patient's ED course, the patient was given:  Medications   aluminum & magnesium hydroxide-simethicone (MAALOX) 30 mL, lidocaine viscous hcl (XYLOCAINE) 5 mL (GI COCKTAIL) ( Oral Given 10/30/21 6179)        CLINICAL IMPRESSION  1. Chest pain, unspecified type    2. Abdominal bloating        Blood pressure 134/80, pulse 79, temperature 97.3 °F (36.3 °C), temperature source Oral, resp. rate 18, height 5' 5\" (1.651 m), weight 279 lb (126.6 kg), SpO2 98 %, not currently breastfeeding. DISPOSITION  Shefali Mendoza was discharged to home in stable condition. Patient was given scripts for the following medications. I counseled patient how to take these medications. Discharge Medication List as of 10/30/2021  4:24 PM          Follow-up with:  Georgie Morris, KENNA - CNP  [address] [facility]  [phone]    Schedule an appointment as soon as possible for a visit in 2 days  For recheck      DISCLAIMER: This chart was created using Dragon dictation software. Efforts were made by me to ensure accuracy, however some errors may be present due to limitations of this technology and occasionally words are not transcribed correctly.         Estela Lewis MD  10/30/21 6400

## 2021-10-30 NOTE — ED NOTES
Pt refused IV and blood. States she is a difficult stick. MD aware.       Bob Jacobsen  10/30/21 6537

## 2021-11-15 ENCOUNTER — OFFICE VISIT (OUTPATIENT)
Dept: FAMILY MEDICINE CLINIC | Age: 51
End: 2021-11-15
Payer: COMMERCIAL

## 2021-11-15 VITALS
BODY MASS INDEX: 45.88 KG/M2 | WEIGHT: 275.4 LBS | SYSTOLIC BLOOD PRESSURE: 132 MMHG | HEART RATE: 72 BPM | OXYGEN SATURATION: 97 % | HEIGHT: 65 IN | DIASTOLIC BLOOD PRESSURE: 76 MMHG

## 2021-11-15 DIAGNOSIS — K58.0 IRRITABLE BOWEL SYNDROME WITH DIARRHEA: ICD-10-CM

## 2021-11-15 DIAGNOSIS — E66.01 CLASS 3 SEVERE OBESITY WITHOUT SERIOUS COMORBIDITY WITH BODY MASS INDEX (BMI) OF 45.0 TO 49.9 IN ADULT, UNSPECIFIED OBESITY TYPE (HCC): ICD-10-CM

## 2021-11-15 PROCEDURE — 99213 OFFICE O/P EST LOW 20 MIN: CPT | Performed by: NURSE PRACTITIONER

## 2021-11-15 RX ORDER — PHENTERMINE HYDROCHLORIDE 37.5 MG/1
37.5 TABLET ORAL
COMMUNITY
End: 2021-11-15 | Stop reason: SDUPTHER

## 2021-11-15 RX ORDER — DICYCLOMINE HYDROCHLORIDE 10 MG/1
10 CAPSULE ORAL
Qty: 120 CAPSULE | Refills: 1 | Status: SHIPPED | OUTPATIENT
Start: 2021-11-15 | End: 2022-08-11 | Stop reason: SDUPTHER

## 2021-11-15 RX ORDER — PHENTERMINE HYDROCHLORIDE 37.5 MG/1
37.5 TABLET ORAL
Qty: 30 TABLET | Refills: 0 | Status: SHIPPED | OUTPATIENT
Start: 2021-11-15 | End: 2021-12-15

## 2021-11-15 ASSESSMENT — ENCOUNTER SYMPTOMS
GASTROINTESTINAL NEGATIVE: 1
RESPIRATORY NEGATIVE: 1

## 2021-11-15 NOTE — PROGRESS NOTES
Patient: Rosario Sheth is a 45 y.o. female who presents today with the following Chief Complaint(s):  Chief Complaint   Patient presents with    Weight Loss     1 month follow up       Assessment:  Encounter Diagnoses   Name Primary?  BMI 45.0-49.9, adult ([facility]) Yes    Irritable bowel syndrome with diarrhea     Class 3 severe obesity without serious comorbidity with body mass index (BMI) of 45.0 to 49.9 in adult, unspecified obesity type ([facility]        Plan:  1. BMI 45.0-49.9, adult [facility])  We will continue with Adipex another month. Patient will follow up in 1 month  - phentermine (ADIPEX-P) 37.5 MG tablet; Take 1 tablet by mouth every morning (before breakfast) for 30 days. Dispense: 30 tablet; Refill: 0    2. Irritable bowel syndrome with diarrhea  We will start on dicyclomine 10 mg 4 times daily as needed with meals and at bedtime. Patient was educated on uses and side effects will follow up in 1 month for effectiveness. 3. Class 3 severe obesity without serious comorbidity with body mass index (BMI) of 45.0 to 49.9 in adult, unspecified obesity type [facility])  Patient will be on Adipex and she was also given the  dietitian information to help with diet. HPI  Patient presents today for a 1 month follow-up on the Adipex. Patient reports that she lost 4 pounds in the last month. She denies any side effects from the Adipex. She does have symptoms of irritable bowel syndrome with diarrhea. She states anytime that she eats it will go straight through her. She states has been going on for years and she has never tried any medications to help with this. She was recently at the ER for some chest pains and was started on Protonix and she states that helps a lot.     Current Outpatient Medications   Medication Sig Dispense Refill    dicyclomine (BENTYL) 10 MG capsule Take 1 capsule by mouth 4 times daily (before meals and nightly) 120 capsule 1    phentermine (ADIPEX-P) 37.5 MG tablet Take 1 tablet by mouth every morning (before breakfast) for 30 days. 30 tablet 0    pantoprazole (PROTONIX) 40 MG tablet Take 1 tablet by mouth every morning (before breakfast) 30 tablet 0    albuterol sulfate HFA (VENTOLIN HFA) 108 (90 Base) MCG/ACT inhaler Inhale 2 puffs into the lungs 4 times daily as needed for Wheezing 54 g 1    fluticasone-salmeterol (ADVAIR DISKUS) 250-50 MCG/DOSE AEPB Inhale 1 puff into the lungs every 12 hours 60 each 2    ipratropium-albuterol (DUONEB) 0.5-2.5 (3) MG/3ML SOLN nebulizer solution Inhale 3 mLs into the lungs every 4 hours 360 mL 3     No current facility-administered medications for this visit. Patient's past medical history, surgical history, family history, medications,and allergies  were all reviewed and updated as appropriate today. Review of Systems   Constitutional: Negative. HENT: Negative. Respiratory: Negative. Cardiovascular: Negative. Gastrointestinal: Negative. Musculoskeletal: Negative. Skin: Negative. Neurological: Negative. Psychiatric/Behavioral: Negative. Physical Exam  Vitals reviewed. Cardiovascular:      Rate and Rhythm: Normal rate and regular rhythm. Heart sounds: Normal heart sounds. Pulmonary:      Effort: Pulmonary effort is normal.      Breath sounds: Normal breath sounds. Skin:     General: Skin is warm and dry. Neurological:      Mental Status: She is alert and oriented to person, place, and time. Vitals:    11/15/21 1413   BP: 132/76   Pulse: 72   SpO2: 97%       This chart was generated using the Dragon dictation system. I created this record but it may contain dictation errors due to the limitation of the software.

## 2021-12-23 ENCOUNTER — VIRTUAL VISIT (OUTPATIENT)
Dept: FAMILY MEDICINE CLINIC | Age: 51
End: 2021-12-23
Payer: COMMERCIAL

## 2021-12-23 DIAGNOSIS — Z20.822 SUSPECTED COVID-19 VIRUS INFECTION: Primary | ICD-10-CM

## 2021-12-23 DIAGNOSIS — J02.9 SORE THROAT: ICD-10-CM

## 2021-12-23 LAB — S PYO AG THROAT QL: NORMAL

## 2021-12-23 PROCEDURE — 99214 OFFICE O/P EST MOD 30 MIN: CPT | Performed by: NURSE PRACTITIONER

## 2021-12-23 PROCEDURE — 87880 STREP A ASSAY W/OPTIC: CPT | Performed by: NURSE PRACTITIONER

## 2021-12-23 RX ORDER — PHENTERMINE HYDROCHLORIDE 37.5 MG/1
37.5 TABLET ORAL
Qty: 30 TABLET | Refills: 0 | Status: SHIPPED | OUTPATIENT
Start: 2021-12-23 | End: 2022-01-22

## 2021-12-23 ASSESSMENT — ENCOUNTER SYMPTOMS
SINUS PRESSURE: 1
WHEEZING: 0
COUGH: 1
SORE THROAT: 1
SHORTNESS OF BREATH: 0
SINUS PAIN: 1
GASTROINTESTINAL NEGATIVE: 1

## 2021-12-23 NOTE — PROGRESS NOTES
2021    TELEHEALTH EVALUATION -- Audio/Visual (During SSXGS-50 public health emergency)    HPI:    Az Bailon (:  1970) has requested an audio/video evaluation for the following concern(s):    Patient presents today for weight loss follow up. She was having symptoms of cough, congestion, body aches and sore throat so she was sent back to her car to do a virtual visit. She was exposed to covid. Her coworker tested positive on Monday. Patient started with symptoms on Tuesday. She states she was also exposed to strep through her niece. She has a history of asthma and COPD. She has a steroid pack at home and was wondering if it would help at all. Weight loss- reports she is down to 270lbs. This is 5 pounds less from last visit. She denies any side effects from the ADipex. Review of Systems   Constitutional: Positive for fatigue. Negative for fever. HENT: Positive for congestion, sinus pressure, sinus pain and sore throat. Negative for ear pain. Respiratory: Positive for cough. Negative for shortness of breath and wheezing. Cardiovascular: Negative. Gastrointestinal: Negative. Musculoskeletal:        Body aches   Neurological: Negative for dizziness and headaches. Psychiatric/Behavioral: Negative. Prior to Visit Medications    Medication Sig Taking? Authorizing Provider   phentermine (ADIPEX-P) 37.5 MG tablet Take 1 tablet by mouth every morning (before breakfast) for 30 days.  Yes KENNA Espinosa CNP   dicyclomine (BENTYL) 10 MG capsule Take 1 capsule by mouth 4 times daily (before meals and nightly) Yes KENNA Hyde CNP   pantoprazole (PROTONIX) 40 MG tablet Take 1 tablet by mouth every morning (before breakfast) Yes Sushant Fu MD   albuterol sulfate HFA (VENTOLIN HFA) 108 (90 Base) MCG/ACT inhaler Inhale 2 puffs into the lungs 4 times daily as needed for Wheezing Yes KENNA Espinosa CNP   fluticasone-salmeterol (ADVAIR DISKUS) 250-50 MCG/DOSE AEPB Inhale 1 puff into the lungs every 12 hours Yes KENNA Hyde CNP   ipratropium-albuterol (DUONEB) 0.5-2.5 (3) MG/3ML SOLN nebulizer solution Inhale 3 mLs into the lungs every 4 hours Yes KENNA Unger CNP       Social History     Tobacco Use    Smoking status: Former Smoker     Packs/day: 0.50     Years: 20.00     Pack years: 10.00     Types: Cigarettes     Quit date: 3/11/2013     Years since quittin.7    Smokeless tobacco: Never Used   Vaping Use    Vaping Use: Never used   Substance Use Topics    Alcohol use: Yes     Comment: rarely    Drug use: No            PHYSICAL EXAMINATION:  [ INSTRUCTIONS:  \"[x]\" Indicates a positive item  \"[]\" Indicates a negative item  -- DELETE ALL ITEMS NOT EXAMINED]  Vital Signs: (As obtained by patient/caregiver or practitioner observation)    Blood pressure-  Heart rate-    Respiratory rate-    Temperature-  Pulse oximetry-     Constitutional: [x] Appears well-developed and well-nourished [x] No apparent distress      [] Abnormal-   Mental status  [x] Alert and awake  [] Oriented to person/place/time [x]Able to follow commands      Eyes:  EOM    []  Normal  [] Abnormal-  Sclera  []  Normal  [] Abnormal -         Discharge []  None visible  [] Abnormal -    HENT:   [x] Normocephalic, atraumatic.   [] Abnormal   [x] Mouth/Throat: Mucous membranes are moist.     External Ears [] Normal  [] Abnormal-     Neck: [] No visualized mass     Pulmonary/Chest: [] Respiratory effort normal.  [] No visualized signs of difficulty breathing or respiratory distress        [] Abnormal-      Musculoskeletal:   [x] Normal gait with no signs of ataxia         [] Normal range of motion of neck        [] Abnormal-       Neurological:        [] No Facial Asymmetry (Cranial nerve 7 motor function) (limited exam to video visit)          [] No gaze palsy        [] Abnormal-         Skin:        [x] No significant exanthematous lesions or discoloration noted on facial skin         [] Abnormal-            Psychiatric:       [] Normal Affect [] No Hallucinations        [] Abnormal-     Other pertinent observable physical exam findings-     ASSESSMENT/PLAN:  1. Suspected COVID-19 virus infection  Will test for covid. Continue with mucinex as needed. Can take the steroid she has and continue to use inhalers. Follow up at the ER if shortness of breath develops. - COVID-19; Future  - COVID-19    2. Sore throat  Will check strep given exposure. - POCT rapid strep A    3. BMI 45.0-49.9, adult ([facility]  Refill given of adipex patient understands this is the last refill. She will need to take 6 months off after this. - phentermine (ADIPEX-P) 37.5 MG tablet; Take 1 tablet by mouth every morning (before breakfast) for 30 days. Dispense: 30 tablet; Refill: 0          Izzy Delong is a 45 y.o. female being evaluated by a Virtual Visit (video visit) encounter to address concerns as mentioned above. A caregiver was present when appropriate. Due to this being a TeleHealth encounter (During QCJGR-68 public health emergency), evaluation of the following organ systems was limited: Vitals/Constitutional/EENT/Resp/CV/GI//MS/Neuro/Skin/Heme-Lymph-Imm. Pursuant to the emergency declaration under the [address] [place] [address], [address] [place] [address] authority and the [employer] Act, this Virtual Visit was conducted with patient's (and/or legal guardian's) consent, to reduce the patient's risk of exposure to COVID-19 and provide necessary medical care. The patient (and/or legal guardian) has also been advised to contact this office for worsening conditions or problems, and seek emergency medical treatment and/or call 911 if deemed necessary. Services were provided through a video synchronous discussion virtually to substitute for in-person clinic visit.  Patient and provider were located at their individual homes.    --KENNA Clement - CNP on 12/23/2021 at 10:48 AM    An electronic signature was used to authenticate this note.

## 2021-12-24 ENCOUNTER — PATIENT MESSAGE (OUTPATIENT)
Dept: FAMILY MEDICINE CLINIC | Age: 51
End: 2021-12-24

## 2021-12-24 LAB — SARS-COV-2: DETECTED

## 2021-12-27 NOTE — TELEPHONE ENCOUNTER
From: Luma Schaefer  To: Mark Martin  Sent: 12/24/2021 11:45 AM EST  Subject: Question regarding COVID-19    Need to have covid test with my name on it please

## 2022-06-06 ENCOUNTER — TELEPHONE (OUTPATIENT)
Dept: OTHER | Facility: CLINIC | Age: 52
End: 2022-06-06

## 2022-06-06 NOTE — TELEPHONE ENCOUNTER
Bell White was contacted today as part of [facility] to schedule a mammogram.         I left a message reminding the patient that they have an open order from KENNA Noel CNP and need to contact Central Scheduling or myself to schedule a mammogram.    Air[address]

## 2022-08-11 ENCOUNTER — TELEPHONE (OUTPATIENT)
Dept: FAMILY MEDICINE CLINIC | Age: 52
End: 2022-08-11

## 2022-08-11 ENCOUNTER — OFFICE VISIT (OUTPATIENT)
Dept: FAMILY MEDICINE CLINIC | Age: 52
End: 2022-08-11
Payer: COMMERCIAL

## 2022-08-11 VITALS
OXYGEN SATURATION: 96 % | SYSTOLIC BLOOD PRESSURE: 132 MMHG | WEIGHT: 280 LBS | DIASTOLIC BLOOD PRESSURE: 70 MMHG | HEART RATE: 78 BPM | HEIGHT: 65 IN | BODY MASS INDEX: 46.65 KG/M2

## 2022-08-11 DIAGNOSIS — E66.01 CLASS 3 SEVERE OBESITY DUE TO EXCESS CALORIES WITH BODY MASS INDEX (BMI) OF 45.0 TO 49.9 IN ADULT, UNSPECIFIED WHETHER SERIOUS COMORBIDITY PRESENT (HCC): Primary | ICD-10-CM

## 2022-08-11 DIAGNOSIS — K58.0 IRRITABLE BOWEL SYNDROME WITH DIARRHEA: ICD-10-CM

## 2022-08-11 DIAGNOSIS — J44.9 CHRONIC OBSTRUCTIVE PULMONARY DISEASE, UNSPECIFIED COPD TYPE (HCC): ICD-10-CM

## 2022-08-11 DIAGNOSIS — L40.9 PSORIASIS: ICD-10-CM

## 2022-08-11 DIAGNOSIS — G47.00 INSOMNIA, UNSPECIFIED TYPE: ICD-10-CM

## 2022-08-11 PROCEDURE — 99214 OFFICE O/P EST MOD 30 MIN: CPT | Performed by: NURSE PRACTITIONER

## 2022-08-11 RX ORDER — CALCIPOTRIENE 50 UG/G
CREAM TOPICAL 2 TIMES DAILY
Qty: 120 G | Refills: 4 | Status: SHIPPED | OUTPATIENT
Start: 2022-08-11

## 2022-08-11 RX ORDER — PHENTERMINE HYDROCHLORIDE 37.5 MG/1
37.5 TABLET ORAL
Qty: 30 TABLET | Refills: 0 | Status: SHIPPED | OUTPATIENT
Start: 2022-08-11 | End: 2022-09-12 | Stop reason: SDUPTHER

## 2022-08-11 RX ORDER — DICYCLOMINE HYDROCHLORIDE 10 MG/1
10 CAPSULE ORAL
Qty: 120 CAPSULE | Refills: 1 | Status: SHIPPED | OUTPATIENT
Start: 2022-08-11 | End: 2022-09-12 | Stop reason: SINTOL

## 2022-08-11 RX ORDER — FLUTICASONE PROPIONATE AND SALMETEROL 250; 50 UG/1; UG/1
1 POWDER RESPIRATORY (INHALATION) EVERY 12 HOURS
Qty: 60 EACH | Refills: 3 | Status: SHIPPED | OUTPATIENT
Start: 2022-08-11 | End: 2022-09-12 | Stop reason: SINTOL

## 2022-08-11 RX ORDER — ALBUTEROL SULFATE 90 UG/1
2 AEROSOL, METERED RESPIRATORY (INHALATION) 4 TIMES DAILY PRN
Qty: 54 G | Refills: 1 | Status: SHIPPED | OUTPATIENT
Start: 2022-08-11

## 2022-08-11 RX ORDER — LANOLIN ALCOHOL/MO/W.PET/CERES
3 CREAM (GRAM) TOPICAL DAILY
COMMUNITY

## 2022-08-11 RX ORDER — PANTOPRAZOLE SODIUM 40 MG/1
40 TABLET, DELAYED RELEASE ORAL
Qty: 30 TABLET | Refills: 5 | Status: SHIPPED | OUTPATIENT
Start: 2022-08-11

## 2022-08-11 ASSESSMENT — PATIENT HEALTH QUESTIONNAIRE - PHQ9
SUM OF ALL RESPONSES TO PHQ QUESTIONS 1-9: 8
6. FEELING BAD ABOUT YOURSELF - OR THAT YOU ARE A FAILURE OR HAVE LET YOURSELF OR YOUR FAMILY DOWN: 0
SUM OF ALL RESPONSES TO PHQ QUESTIONS 1-9: 8
9. THOUGHTS THAT YOU WOULD BE BETTER OFF DEAD, OR OF HURTING YOURSELF: 0
SUM OF ALL RESPONSES TO PHQ QUESTIONS 1-9: 8
8. MOVING OR SPEAKING SO SLOWLY THAT OTHER PEOPLE COULD HAVE NOTICED. OR THE OPPOSITE, BEING SO FIGETY OR RESTLESS THAT YOU HAVE BEEN MOVING AROUND A LOT MORE THAN USUAL: 0
2. FEELING DOWN, DEPRESSED OR HOPELESS: 3
SUM OF ALL RESPONSES TO PHQ QUESTIONS 1-9: 8
1. LITTLE INTEREST OR PLEASURE IN DOING THINGS: 0
7. TROUBLE CONCENTRATING ON THINGS, SUCH AS READING THE NEWSPAPER OR WATCHING TELEVISION: 0
4. FEELING TIRED OR HAVING LITTLE ENERGY: 2
3. TROUBLE FALLING OR STAYING ASLEEP: 3
SUM OF ALL RESPONSES TO PHQ9 QUESTIONS 1 & 2: 3
5. POOR APPETITE OR OVEREATING: 0
10. IF YOU CHECKED OFF ANY PROBLEMS, HOW DIFFICULT HAVE THESE PROBLEMS MADE IT FOR YOU TO DO YOUR WORK, TAKE CARE OF THINGS AT HOME, OR GET ALONG WITH OTHER PEOPLE: 1

## 2022-08-11 ASSESSMENT — ENCOUNTER SYMPTOMS
GASTROINTESTINAL NEGATIVE: 1
RESPIRATORY NEGATIVE: 1

## 2022-08-11 NOTE — PROGRESS NOTES
Patient: Waylon Grant is a 45 y.o. female who presents today with the following Chief Complaint(s):  Chief Complaint   Patient presents with    Medication Refill       Assessment:  Encounter Diagnoses   Name Primary? Class 3 severe obesity due to excess calories with body mass index (BMI) of 45.0 to 49.9 in adult, unspecified whether serious comorbidity present (HCC) Yes    Insomnia, unspecified type     Irritable bowel syndrome with diarrhea     BMI 45.0-49.9, adult (HCC)     Chronic obstructive pulmonary disease, unspecified COPD type ([facility] [address]     Psoriasis        Plan:  1. Class 3 severe obesity due to excess calories with body mass index (BMI) of 45.0 to 49.9 in adult, unspecified whether serious comorbidity present ([facility] [address]  We will start patient back on Adipex once daily, she understands uses and side effects will follow-up in 1 month. I will also refer her to talk to our dietitian for help with weight loss. - phentermine (ADIPEX-P) 37.5 MG tablet; Take 1 tablet by mouth every morning (before breakfast) for 30 days. Dispense: 30 tablet; Refill: 0  - Lipid Panel; Future  - Comprehensive Metabolic Panel; Future    2. Insomnia, unspecified type  Insomnia likely related to shift work. We will continue current medications for now and follow-up in 1 month. - Comprehensive Metabolic Panel; Future    3. Irritable bowel syndrome with diarrhea  Stable we will continue current medications and follow-up in 1 month. 4. BMI 45.0-49.9, adult ([facility]  - phentermine (ADIPEX-P) 37.5 MG tablet; Take 1 tablet by mouth every morning (before breakfast) for 30 days. Dispense: 30 tablet; Refill: 0    5. Chronic obstructive pulmonary disease, unspecified COPD type ([facility] [address]  Refill sent in of Advair, will need prior authorization through the insurance. - fluticasone-salmeterol (ADVAIR) 250-50 MCG/ACT AEPB diskus inhaler; Inhale 1 puff into the lungs in the morning and 1 puff in the evening. Dispense: 60 each; Refill: 3    6. Psoriasis  I discussed the psoriasis with my collaborator Dr. Jose Angel Perea and he advised that Vamsi Hunt would be fine to start her on but also prescribed the Dovonex cream.  We will notify patient that that was sent to the pharmacy again and will need a prior authorization through her insurance so once approved and she can start that and will follow-up in 1 month. - [facility] - Reji Johnson, DO, Dermatology, [place]  - calcipotriene (DOVONEX) 0.005 % cream; Apply topically 2 times daily  Dispense: 120 g; Refill: 4  - Apremilast 10 & 20 & 30 MG TBPK; Day 1: 10 mg in morning,Day 2: 10 mg in morning and 10 mg in eveningDay 3: 10 mg in morning and 20 mg in evening,Day 4: 20 mg in morning and 20 mg in evening Day 5: 20 mg in morning and 30 mg in evening. Then 30 mg twice daily  Dispense: 55 each; Refill: 0    HPI  Patient presents today to follow-up on chronic conditions. Overall she states she is feeling well but she does have concerns about her weight. She would like to consider trying Adipex again it worked well for her when she tried it last year. It has been over 6 months since she last was on. She does need refills of her prescriptions today. She has a history of psoriasis and would like to either try Vamsi Hunt or see a dermatologist. She has tried multiple other creams with no improvement. She has insomnia but works 3rd shift. Current Outpatient Medications   Medication Sig Dispense Refill    albuterol sulfate HFA (VENTOLIN HFA) 108 (90 Base) MCG/ACT inhaler Inhale 2 puffs into the lungs 4 times daily as needed for Wheezing 54 g 1    dicyclomine (BENTYL) 10 MG capsule Take 1 capsule by mouth 4 times daily (before meals and nightly) 120 capsule 1    pantoprazole (PROTONIX) 40 MG tablet Take 1 tablet by mouth every morning (before breakfast) 30 tablet 5    melatonin 3 MG TABS tablet Take 3 mg by mouth in the morning.       phentermine (ADIPEX-P) 37.5 MG tablet Take 1 tablet by mouth every morning (before breakfast) for 30 days. 30 tablet 0    fluticasone-salmeterol (ADVAIR) 250-50 MCG/ACT AEPB diskus inhaler Inhale 1 puff into the lungs in the morning and 1 puff in the evening. 60 each 3    calcipotriene (DOVONEX) 0.005 % cream Apply topically 2 times daily 120 g 4    Apremilast 10 & 20 & 30 MG TBPK Day 1: 10 mg in morning,Day 2: 10 mg in morning and 10 mg in eveningDay 3: 10 mg in morning and 20 mg in evening,Day 4: 20 mg in morning and 20 mg in evening Day 5: 20 mg in morning and 30 mg in evening. Then 30 mg twice daily 55 each 0    ipratropium-albuterol (DUONEB) 0.5-2.5 (3) MG/3ML SOLN nebulizer solution Inhale 3 mLs into the lungs every 4 hours 360 mL 3     No current facility-administered medications for this visit. Patient's past medical history, surgical history, family history, medications,and allergies  were all reviewed and updated as appropriate today. Review of Systems   Constitutional:  Positive for fatigue. HENT: Negative. Respiratory: Negative. Cardiovascular: Negative. Gastrointestinal: Negative. Musculoskeletal: Negative. Skin:  Positive for rash. Neurological:  Negative for dizziness and headaches. Psychiatric/Behavioral:  Positive for sleep disturbance. Negative for self-injury and suicidal ideas. Physical Exam  Vitals reviewed. Cardiovascular:      Rate and Rhythm: Normal rate and regular rhythm. Pulmonary:      Effort: Pulmonary effort is normal.      Breath sounds: Normal breath sounds. Skin:     General: Skin is warm and dry. Findings: Rash present. Comments: Plaque Psoriasis scattered through out body   Neurological:      Mental Status: She is alert and oriented to person, place, and time. Vitals:    08/11/22 1419   BP: 132/70   Pulse: 78   SpO2: 96%       This chart was generated using the Dragon dictation system. I created this record but it may contain dictation errors due to the limitation of the software.

## 2022-08-11 NOTE — TELEPHONE ENCOUNTER
Please let patient know that I did speak with my collaborator and he did feel that Speedy Viveros would be appropriate for her psoriasis. Please let her know I did send that to her pharmacy but will need to be a prior authorization through the insurance that she likely will not feel to start that until next week. The instructions are on the prescription but she will start off slow with 10 mg once a day then twice a day then 10 in the morning 20 at night and so on until she is up to 30 mg twice a day. I also sent in a cream called Dovonex cream that she will also use twice a day and she will follow-up with me in 1 month.

## 2022-08-11 NOTE — TELEPHONE ENCOUNTER
[address] Needs Pt's BMI to fill phentermine (ADIPEX-P) 37.5 MG tablet .     [address] [facility], [address] [phone]

## 2022-08-12 ENCOUNTER — CARE COORDINATION (OUTPATIENT)
Dept: CARE COORDINATION | Age: 52
End: 2022-08-12

## 2022-08-12 SDOH — ECONOMIC STABILITY: INCOME INSECURITY: IN THE LAST 12 MONTHS, WAS THERE A TIME WHEN YOU WERE NOT ABLE TO PAY THE MORTGAGE OR RENT ON TIME?: NO

## 2022-08-12 SDOH — ECONOMIC STABILITY: HOUSING INSECURITY
IN THE LAST 12 MONTHS, WAS THERE A TIME WHEN YOU DID NOT HAVE A STEADY PLACE TO SLEEP OR SLEPT IN A SHELTER (INCLUDING NOW)?: NO

## 2022-08-12 SDOH — ECONOMIC STABILITY: FOOD INSECURITY: WITHIN THE PAST 12 MONTHS, THE FOOD YOU BOUGHT JUST DIDN'T LAST AND YOU DIDN'T HAVE MONEY TO GET MORE.: NEVER TRUE

## 2022-08-12 SDOH — ECONOMIC STABILITY: TRANSPORTATION INSECURITY
IN THE PAST 12 MONTHS, HAS LACK OF TRANSPORTATION KEPT YOU FROM MEETINGS, WORK, OR FROM GETTING THINGS NEEDED FOR DAILY LIVING?: NO

## 2022-08-12 SDOH — ECONOMIC STABILITY: FOOD INSECURITY: WITHIN THE PAST 12 MONTHS, YOU WORRIED THAT YOUR FOOD WOULD RUN OUT BEFORE YOU GOT MONEY TO BUY MORE.: NEVER TRUE

## 2022-08-12 SDOH — ECONOMIC STABILITY: TRANSPORTATION INSECURITY
IN THE PAST 12 MONTHS, HAS THE LACK OF TRANSPORTATION KEPT YOU FROM MEDICAL APPOINTMENTS OR FROM GETTING MEDICATIONS?: NO

## 2022-08-12 ASSESSMENT — SOCIAL DETERMINANTS OF HEALTH (SDOH): HOW HARD IS IT FOR YOU TO PAY FOR THE VERY BASICS LIKE FOOD, HOUSING, MEDICAL CARE, AND HEATING?: NOT HARD AT ALL

## 2022-08-12 NOTE — CARE COORDINATION
Ambulatory Care Coordination Note  8/12/2022    ACC: Nathan Parikh, RN    Summary Note:   Introduced role of ACM/CC; patient agrees to f/u  Began CC assessment  Patient works night shift 6 days a week for [employer]; she usually wakes up around 2 pm   Patient reports her scheduled days off are [place] and Wed however she often gets called in on those days for overtime. She further reports her day off is on Sunday  She prefers to communicate via My Chart if possible   Discussed COPD; reviewed definition of COPD and triggers  Encouraged patient to utilize the [employer] in her My Chart for additional information  Reviewed how to access the [employer]; patient very appreciative    Ambulatory Care Coordination Assessment    Care Coordination Protocol  Referral from Primary Care Provider: Yes  Week 1 - Initial Assessment     Do you have all of your prescriptions and are they filled?: Yes (Comment: Reviewed 8/12/2022)     Do you have Home O2 Therapy?: No      Ability to seek help/take action for Emergent Urgent situations i.e. fire, crime, inclement weather or health crisis. : Independent  Ability to ambulate to restroom: Independent  Ability handle personal hygeine needs (bathing/dressing/grooming): Independent  Ability to manage Medications: Independent  Ability to prepare Food Preparation: Independent  Ability to maintain home (clean home, laundry): Independent  Ability to drive and/or has transportation: Independent  Ability to do shopping: Independent  Ability to manage finances:  Independent  Is patient able to live independently?: Yes     Current Housing: Private Residence        Per the Fall Risk Screening, did the patient have 2 or more falls or 1 fall with injury in the past year?: No           Thinking about your patient's physical health needs, are there any symptoms or problems (risk indicators) you are unsure about that require further investigation?: No identified areas of uncertainly or problems already being investigated   Are the patients physical health problems impacting on their mental well-being?: No identified areas of concern   Are there any problems with your patients lifestyle behaviors (alcohol, drugs, diet, exercise) that are impacting on physical or mental well-being?: No identified areas of concern   Do you have any other concerns about your patients mental well-being? How would you rate their severity and impact on the patient?: No identified areas of concern   How would you rate their financial resources (including ability to afford all required medical care)?: Financially secure, resources adequate, no identified problems   How wells does the patient now understand their health and well-being (symptoms, signs or risk factors) and what they need to do to manage their health?: Reasonable to good understanding and already engages in managing health or is willing to undertake better management   How well do you think your patient can engage in healthcare discussions? (Barriers include language, deafness, aphasia, alcohol or drug problems, learning difficulties, concentration): Clear and open communication, no identified barriers   Suggested Interventions and Community Resources   Zone Management Tools: In Process                    Prior to Admission medications    Medication Sig Start Date End Date Taking? Authorizing Provider   albuterol sulfate HFA (VENTOLIN HFA) 108 (90 Base) MCG/ACT inhaler Inhale 2 puffs into the lungs 4 times daily as needed for Wheezing 8/11/22  Yes KENNA Hyde CNP   phentermine (ADIPEX-P) 37.5 MG tablet Take 1 tablet by mouth every morning (before breakfast) for 30 days. 8/11/22 9/10/22 Yes KENNA Hyde CNP   fluticasone-salmeterol (ADVAIR) 250-50 MCG/ACT AEPB diskus inhaler Inhale 1 puff into the lungs in the morning and 1 puff in the evening.  8/11/22  Yes KENNA Hyde CNP   ipratropium-albuterol (DUONEB) 0.5-2.5 (3) MG/3ML SOLN nebulizer solution Inhale 3 mLs into the lungs every 4 hours 10/14/21  Yes KENNA Atkinson CNP   dicyclomine (BENTYL) 10 MG capsule Take 1 capsule by mouth 4 times daily (before meals and nightly) 8/11/22   KENNA Atkinson CNP   pantoprazole (PROTONIX) 40 MG tablet Take 1 tablet by mouth every morning (before breakfast) 8/11/22   KENNA Atkinson CNP   melatonin 3 MG TABS tablet Take 3 mg by mouth in the morning. Historical Provider, MD   calcipotriene (DOVONEX) 0.005 % cream Apply topically 2 times daily 8/11/22   KENNA Atkinson CNP   Apremilast 10 & 20 & 30 MG TBPK Day 1: 10 mg in morning,Day 2: 10 mg in morning and 10 mg in eveningDay 3: 10 mg in morning and 20 mg in evening,Day 4: 20 mg in morning and 20 mg in evening Day 5: 20 mg in morning and 30 mg in evening.  Then 30 mg twice daily 8/11/22   KENNA Atkinson CNP       Future Appointments   Date Time Provider Parisa Montalvo   9/12/2022  2:20 PM KENNA Atkinson CNP  BENJIE GUEVARA

## 2022-08-16 ENCOUNTER — TELEPHONE (OUTPATIENT)
Dept: ADMINISTRATIVE | Age: 52
End: 2022-08-16

## 2022-08-17 NOTE — TELEPHONE ENCOUNTER
This came in yesterday, no sure if the PA dept is needing you to answer the questions listed or not. Please look over.

## 2022-08-18 ENCOUNTER — CARE COORDINATION (OUTPATIENT)
Dept: CARE COORDINATION | Age: 52
End: 2022-08-18

## 2022-08-18 NOTE — TELEPHONE ENCOUNTER
Submitted PA for Otezla 10 & 20 & 30MG tablets, Key: [id number]. Medication has been DENIED. Denial letter attached. Please notify patient. Thank you.

## 2022-08-19 NOTE — CARE COORDINATION
Taylerbrennen Chaudhari  August 19, 2022    Initial Referral Reason: Desired Weight Loss     Patient Care Team:  KENNA Cedillo CNP as PCP - General (Nurse Practitioner Family)  KENNA Cedillo CNP as PCP - RE[facility]ABILITATION [facility]  Juan Garcia RD as Dietitian (Dietitian Registered)    Past Medical History:    Current Outpatient Medications   Medication Sig Dispense Refill    albuterol sulfate HFA (VENTOLIN HFA) 108 (90 Base) MCG/ACT inhaler Inhale 2 puffs into the lungs 4 times daily as needed for Wheezing 54 g 1    dicyclomine (BENTYL) 10 MG capsule Take 1 capsule by mouth 4 times daily (before meals and nightly) 120 capsule 1    pantoprazole (PROTONIX) 40 MG tablet Take 1 tablet by mouth every morning (before breakfast) 30 tablet 5    melatonin 3 MG TABS tablet Take 3 mg by mouth in the morning. phentermine (ADIPEX-P) 37.5 MG tablet Take 1 tablet by mouth every morning (before breakfast) for 30 days. 30 tablet 0    fluticasone-salmeterol (ADVAIR) 250-50 MCG/ACT AEPB diskus inhaler Inhale 1 puff into the lungs in the morning and 1 puff in the evening. 60 each 3    calcipotriene (DOVONEX) 0.005 % cream Apply topically 2 times daily 120 g 4    Apremilast 10 & 20 & 30 MG TBPK Day 1: 10 mg in morning,Day 2: 10 mg in morning and 10 mg in eveningDay 3: 10 mg in morning and 20 mg in evening,Day 4: 20 mg in morning and 20 mg in evening Day 5: 20 mg in morning and 30 mg in evening. Then 30 mg twice daily 55 each 0    ipratropium-albuterol (DUONEB) 0.5-2.5 (3) MG/3ML SOLN nebulizer solution Inhale 3 mLs into the lungs every 4 hours 360 mL 3     No current facility-administered medications for this visit.        Biochemical Data, Medical Tests and Procedures:    Lab Results   Component Value Date    LABA1C 5.3 10/14/2021     Lab Results   Component Value Date    .4 10/14/2021       Lab Results   Component Value Date    CHOL 199 02/17/2021    CHOL 169 05/03/2011    CHOL 147 10/27/2010     Lab Results Component Value Date    TRIG 165 (H) 02/17/2021    TRIG 150 (H) 05/03/2011    TRIG 161 (H) 10/27/2010     Lab Results   Component Value Date    HDL 47 02/17/2021    HDL 43 05/03/2011    HDL 38 (L) 10/27/2010     Lab Results   Component Value Date    LDLCALC 119 (H) 02/17/2021    LDLCALC 96 05/03/2011    LDLCALC 77 10/27/2010     Lab Results   Component Value Date    LABVLDL 33 02/17/2021    LABVLDL 30 05/03/2011    LABVLDL 32 10/27/2010     No results found for: CHOLHDLRATIO    Lab Results   Component Value Date    WBC 8.6 10/30/2021    HGB 13.7 10/30/2021    HCT 40.9 10/30/2021    MCV 83.1 10/30/2021     10/30/2021       Lab Results   Component Value Date    CREATININE <0.5 (L) 10/30/2021    BUN 15 10/30/2021     10/30/2021    K 4.0 10/30/2021     10/30/2021    CO2 28 10/30/2021         Anthropometric Measurements:    Height: 65\"  Weight: 280 lb (127 kg) 8/11/22  BMI: 46.59 kg/m2 (obese, class III)  IBW: 125 lb +-10%  %IBW: 224 %    Physical Exam Findings:  Deferred    Nutrition Interview: RD called pt, explained reason for call and role in care. Pt states her main nutrition concern is knowing what to eat to facilitate weight loss. Patient reports that she works four to six days per week at [employer], third shift. Because of this, patient's meal intake is inconsistent. Patient admits that she is under a lot of stress. Note patient takes Adipex 37.5 mg once daily. Patient's physical activity consists of doing various exercises (stretches, lunges, etc) twice throughout work shift. Otherwise, patient stays active while doing housework on her days off. RD explained the importance of setting healthy, realistic goals. Discussed you are more likely to succeed when you set realistic goals for yourself- make small changes step by step and you will see a big impact with time. Explained the importance of eating at least 3 meals/day and making these meals balanced with a variety of food.  Discussed the components of a balanced meal using the MyPlate [id number]-7/1 plate fruits and/or vegetables, 1/4 plate protein and 1/4 plate starchy carbohydrates with 8 oz glass of low fat milk if desired. Provided an example of a balanced meal: grilled chicken with broccoli and a serving of brown rice. Discussed watching portion sizes to help manage calorie intake. RD encouraged patient to focus on eating 3 balanced meals a day instead of 2 meals/day. Explained the importance of meeting estimated nutrition needs. Explained if we are not eating enough throughout the day, our body will go into starvation mode and hold onto fat cells. RD reiterated importance of choosing fruits, vegetables, whole grains, lean protein sources and low fat dairy products. RD discussed the importance of incorporating physical activity. Explained the recommended amount is 150 minutes/week. RD acknowledged patient's readiness to change and encouraged pt to focus on making small changes one at a time. RD offered to mail educational handouts to pt to reinforce concepts discussed during phone conversation, pt was agreeable. RD will send handouts via [employer] and mail. RD verified patient's home address. 24-Hour Diet Recall  Breakfast  Consumed: Bar[employer]     Lunch  Consumed: None     Dinner  Consumed: Deli sandwich or pizza     Snacks  Consumed: Varies     Beverages: Water     Frequency of meals away from home: At least twice/weekly     Nutrition Diagnosis:  #1 Problem Overweight/Obesity       Etiology related to lack of prior nutrition education and poor food choices        Signs/Symptoms as evidenced by per patient report and BMI of 46.59 kg/m2    Nutrition Intervention:     Estimated Needs  regular diet providing 1900 kcals to promote 1.5 lb weight loss per week ([address] [place] [address]. [place] based on CBW).     Estimated daily CHO Needs: 275 g (based on 45-65% total calorie intake)  Estimated daily Protein Needs: 100-125 g (based on 0.8-1.0 g/kg based on CBW)  Estimated daily Fluid Needs: 1 mL/kcal    #1 Nutrition Information: Provided patient with Mindful Eating, Handling Weight Plateaus, Making Healthy Choices with a Healthy Plate, Label Reading, Dining Out Tips, Ways to Increase Physical Activity, Eat Frequently to Lose Weight, Changing My Eating Habits, All Foods Can Fit, Grocery Shopping Tips, Portion Size Guide, Smart Snacking, Helpful Weight Loss Tips, 1800 Calorie Meal Plan handouts for reinforcement of concepts discussed during nutrition interview. #2 Nutrition Counseling: Used open-ended questions to assess patients perceived susceptibility and severity of disease state. Discussed potential impact of health threat on patient's lifestyle. Used open-ended questions to assess patient's perception regarding benefits of and barriers to implementation of nutrition therapy. #3 Nutrition Education: Clearly defined the benefits of nutrition therapy. Summarized and affirmed positive aspects of current nutrition patterns. Provided education regarding value of adherence to regular diet. Discussed ways to establish applying concepts of alternatives and choices regarding implementation of diet. Explored ideas for small, incremental goals to initiate behavior change. Monitoring and Evaluation:    Indicator/Goal Criteria   #1 Follow MyPlate guidelines #1 I will build balanced meal using the MyPlate reference: 1/2 plate fruits and/or vegetables, 1/4 plate protein, and 1/4 plate starchy carbohydrates with 8 oz glass of low fat milk if desired   #2 Eat consistently throughout the day  #2 I will eat three meals per day or four to six small meals per day    #3 Incorporate physical activity  #3 I will work to attain 150 minutes of physical activity per week      Follow Up: RD will call pt in three weeks to follow up and make sure pt received handouts in mail. RD will answer any nutrition related questions at this time.      Gianfranco Shafer, [address] [place] [address],    [phone]

## 2022-09-12 ENCOUNTER — CARE COORDINATION (OUTPATIENT)
Dept: CARE COORDINATION | Age: 52
End: 2022-09-12

## 2022-09-12 ENCOUNTER — OFFICE VISIT (OUTPATIENT)
Dept: FAMILY MEDICINE CLINIC | Age: 52
End: 2022-09-12
Payer: COMMERCIAL

## 2022-09-12 VITALS
DIASTOLIC BLOOD PRESSURE: 84 MMHG | SYSTOLIC BLOOD PRESSURE: 128 MMHG | BODY MASS INDEX: 46.32 KG/M2 | HEIGHT: 65 IN | OXYGEN SATURATION: 98 % | HEART RATE: 71 BPM | WEIGHT: 278 LBS

## 2022-09-12 DIAGNOSIS — G47.00 INSOMNIA, UNSPECIFIED TYPE: ICD-10-CM

## 2022-09-12 DIAGNOSIS — E66.01 CLASS 3 SEVERE OBESITY DUE TO EXCESS CALORIES WITH BODY MASS INDEX (BMI) OF 45.0 TO 49.9 IN ADULT, UNSPECIFIED WHETHER SERIOUS COMORBIDITY PRESENT (HCC): Primary | ICD-10-CM

## 2022-09-12 DIAGNOSIS — J44.9 CHRONIC OBSTRUCTIVE PULMONARY DISEASE, UNSPECIFIED COPD TYPE (HCC): ICD-10-CM

## 2022-09-12 DIAGNOSIS — K58.0 IRRITABLE BOWEL SYNDROME WITH DIARRHEA: ICD-10-CM

## 2022-09-12 PROCEDURE — 99214 OFFICE O/P EST MOD 30 MIN: CPT | Performed by: NURSE PRACTITIONER

## 2022-09-12 RX ORDER — PHENTERMINE HYDROCHLORIDE 37.5 MG/1
37.5 TABLET ORAL
Qty: 30 TABLET | Refills: 0 | Status: SHIPPED | OUTPATIENT
Start: 2022-09-12 | End: 2022-10-12

## 2022-09-12 RX ORDER — HYDROXYZINE HYDROCHLORIDE 25 MG/1
25 TABLET, FILM COATED ORAL NIGHTLY
Qty: 30 TABLET | Refills: 0 | Status: SHIPPED | OUTPATIENT
Start: 2022-09-12 | End: 2022-10-12

## 2022-09-12 ASSESSMENT — ENCOUNTER SYMPTOMS
GASTROINTESTINAL NEGATIVE: 1
RESPIRATORY NEGATIVE: 1

## 2022-09-12 NOTE — PROGRESS NOTES
Patient: Kavitha Hatch is a 47 y.o. female who presents today with the following Chief Complaint(s):  Chief Complaint   Patient presents with    Follow-up       Assessment:  Encounter Diagnoses   Name Primary? Class 3 severe obesity due to excess calories with body mass index (BMI) of 45.0 to 49.9 in adult, unspecified whether serious comorbidity present (HCC) Yes    BMI 45.0-49.9, adult (HCC)     Irritable bowel syndrome with diarrhea     Insomnia, unspecified type     Chronic obstructive pulmonary disease, unspecified COPD type ([facility]        Plan:  1. Class 3 severe obesity due to excess calories with body mass index (BMI) of 45.0 to 49.9 in adult, unspecified whether serious comorbidity present [facility])  We will continue Adipex for 1 more month and she will follow-up in 1 month. - phentermine (ADIPEX-P) 37.5 MG tablet; Take 1 tablet by mouth every morning (before breakfast) for 30 days. Dispense: 30 tablet; Refill: 0    2. BMI 45.0-49.9, adult ([facility])  - phentermine (ADIPEX-P) 37.5 MG tablet; Take 1 tablet by mouth every morning (before breakfast) for 30 days. Dispense: 30 tablet; Refill: 0    3. Irritable bowel syndrome with diarrhea  Irritable bowel is currently stable. No medications at this time. 4. Insomnia, unspecified type  Will start hydroxyzine 25 mg nightly and follow-up if no improvement. - hydrOXYzine HCl (ATARAX) 25 MG tablet; Take 1 tablet by mouth nightly  Dispense: 30 tablet; Refill: 0    5. Chronic obstructive pulmonary disease, unspecified COPD type ([facility]  Patient was not tolerating the Advair powder so we will try switching to Asmanex and follow-up if no improvement. - mometasone (ASMANEX, 30 METERED DOSES,) 220 MCG/INH inhaler; Inhale 1 puff into the lungs daily  Dispense: 1 each; Refill: 3    HPI  Patient presents today for follow-up on weight loss. She has been on Adipex for the past month. She reports that it is going well.   She does notice a decrease in her appetite and she does feel like her clothes are feeling a little bit looser. The scale today shows that she is lost 2 pounds since her last visit. Also following up on irritable bowel syndrome. She states the dicyclomine was causing her to have constipation. She has since stopped it and her stools have been more regular and she is not having the diarrhea currently. She does have trouble sleeping at night. She does work third shift and reports having trouble getting to sleep when she gets home. Current Outpatient Medications   Medication Sig Dispense Refill    phentermine (ADIPEX-P) 37.5 MG tablet Take 1 tablet by mouth every morning (before breakfast) for 30 days. 30 tablet 0    hydrOXYzine HCl (ATARAX) 25 MG tablet Take 1 tablet by mouth nightly 30 tablet 0    mometasone (ASMANEX, 30 METERED DOSES,) 220 MCG/INH inhaler Inhale 1 puff into the lungs daily 1 each 3    albuterol sulfate HFA (VENTOLIN HFA) 108 (90 Base) MCG/ACT inhaler Inhale 2 puffs into the lungs 4 times daily as needed for Wheezing 54 g 1    pantoprazole (PROTONIX) 40 MG tablet Take 1 tablet by mouth every morning (before breakfast) 30 tablet 5    melatonin 3 MG TABS tablet Take 3 mg by mouth in the morning. calcipotriene (DOVONEX) 0.005 % cream Apply topically 2 times daily 120 g 4    Apremilast 10 & 20 & 30 MG TBPK Day 1: 10 mg in morning,Day 2: 10 mg in morning and 10 mg in eveningDay 3: 10 mg in morning and 20 mg in evening,Day 4: 20 mg in morning and 20 mg in evening Day 5: 20 mg in morning and 30 mg in evening. Then 30 mg twice daily 55 each 0    ipratropium-albuterol (DUONEB) 0.5-2.5 (3) MG/3ML SOLN nebulizer solution Inhale 3 mLs into the lungs every 4 hours 360 mL 3     No current facility-administered medications for this visit. Patient's past medical history, surgical history, family history, medications,and allergies  were all reviewed and updated as appropriate today. Review of Systems   Constitutional: Negative.     HENT: Negative. Respiratory: Negative. Cardiovascular: Negative. Gastrointestinal: Negative. Musculoskeletal: Negative. Neurological: Negative. Psychiatric/Behavioral: Negative. Physical Exam  Vitals reviewed. Cardiovascular:      Rate and Rhythm: Normal rate and regular rhythm. Heart sounds: Normal heart sounds. Pulmonary:      Effort: Pulmonary effort is normal.   Abdominal:      General: Bowel sounds are normal.   Skin:     General: Skin is warm and dry. Neurological:      Mental Status: She is alert and oriented to person, place, and time. Vitals:    09/12/22 1420   BP: 128/84   Pulse: 71   SpO2: 98%       This chart was generated using the Dragon dictation system. I created this record but it may contain dictation errors due to the limitation of the software.

## 2022-09-13 NOTE — CARE COORDINATION
Geovani Marion  9/13/2022    Registered Dietitian Progress Note for Care Coordination    Assessment: Jenny Patino is a 47 y.o. female. RD referred for desired weight loss. RD spoke with patient for initial nutrition assessment on 8/18/22. RD called to follow up with patient today, 9/12/22. RD discussed previous goals with patient. Patient states she received the handouts RD sent, did not have any questions. Patient reports that she continues to eat one meal per day. C/o early satiety and decreased since starting Adipex one month ago. Patient reports that she has been trying to build balanced meals. Patient states she has cut back on portion sizes and that her meals are smaller than before. Patient has been eating oatmeal for breakfast and snacking on peanut butter crackers and peanut butter pretzels. Patient reports she has increased water intake and has been sucking on hard candy as the Adipex causes xerostomia. Patient has not been regularly exercising as she has been working six days/week. Patient has been staying active, however, by painting her trailer and doing other household chores on her days off. Patient has lost 2 lb (1.1%) x past one month. Nutrition Monitoring and Evaluation  Indicator/Goal Criteria Progress   #1 Follow MyPlate guidelines #1 I will build balanced meal using the MyPlate reference: 1/2 plate fruits and/or vegetables, 1/4 plate protein, and 1/4 plate starchy carbohydrates with 8 oz glass of low fat milk if desired #1 Patient has been trying to build balanced meals    #2 Eat consistently throughout the day  #2 I will eat three meals per day or four to six small meals per day  #2 Patient has been eating one meal per day   #3 Incorporate physical activity  #3 I will work to attain 150 minutes of physical activity per week  #3 Patient has been staying active by doing household chores and painting trailer     Plan of Care:  RD encouraged patient to keep working toward goals set.  RD will follow up with patient to discuss any questions patient has and check the progress toward goals. Follow Up:    RD will call patient in four weeks to follow up and answer any nutrition related questions at that time.      Crystal Knapp, [address] [place] [address],    [phone]

## 2022-10-11 ENCOUNTER — CARE COORDINATION (OUTPATIENT)
Dept: CARE COORDINATION | Age: 52
End: 2022-10-11

## 2022-10-11 NOTE — CARE COORDINATION
Contacted Brian Dove and left voicemail regarding Dietitian follow up. Left call back number and will follow up as appropriate.        Inez Piedra, [address] [place] [address], [place]  [phone]

## 2022-10-17 ENCOUNTER — OFFICE VISIT (OUTPATIENT)
Dept: FAMILY MEDICINE CLINIC | Age: 52
End: 2022-10-17
Payer: COMMERCIAL

## 2022-10-17 VITALS
WEIGHT: 279 LBS | DIASTOLIC BLOOD PRESSURE: 86 MMHG | BODY MASS INDEX: 46.43 KG/M2 | OXYGEN SATURATION: 97 % | HEART RATE: 74 BPM | SYSTOLIC BLOOD PRESSURE: 126 MMHG

## 2022-10-17 DIAGNOSIS — E66.01 CLASS 3 SEVERE OBESITY WITH SERIOUS COMORBIDITY AND BODY MASS INDEX (BMI) OF 45.0 TO 49.9 IN ADULT, UNSPECIFIED OBESITY TYPE (HCC): Primary | ICD-10-CM

## 2022-10-17 DIAGNOSIS — J44.9 CHRONIC OBSTRUCTIVE PULMONARY DISEASE, UNSPECIFIED COPD TYPE (HCC): ICD-10-CM

## 2022-10-17 DIAGNOSIS — J02.9 SORE THROAT: ICD-10-CM

## 2022-10-17 LAB — S PYO AG THROAT QL: NORMAL

## 2022-10-17 PROCEDURE — 87880 STREP A ASSAY W/OPTIC: CPT | Performed by: NURSE PRACTITIONER

## 2022-10-17 PROCEDURE — 99213 OFFICE O/P EST LOW 20 MIN: CPT | Performed by: NURSE PRACTITIONER

## 2022-10-17 RX ORDER — FLUTICASONE PROPIONATE AND SALMETEROL 250; 50 UG/1; UG/1
1 POWDER RESPIRATORY (INHALATION) EVERY 12 HOURS
Qty: 60 EACH | Refills: 3 | Status: SHIPPED | OUTPATIENT
Start: 2022-10-17

## 2022-10-17 RX ORDER — PHENTERMINE HYDROCHLORIDE 37.5 MG/1
37.5 TABLET ORAL
Qty: 30 TABLET | Refills: 0 | Status: SHIPPED | OUTPATIENT
Start: 2022-10-17 | End: 2022-11-16

## 2022-10-17 ASSESSMENT — ENCOUNTER SYMPTOMS
SORE THROAT: 1
RHINORRHEA: 1
RESPIRATORY NEGATIVE: 1
GASTROINTESTINAL NEGATIVE: 1

## 2022-10-17 NOTE — PATIENT INSTRUCTIONS
Please make an appointment with one of our [facility] Consultants, Dr. Bassem Khan or Luis Villarreal. They will work with you to develop a plan to improve your overall well-being by addressing any behavioral or mental health factors that are influencing your health. You can schedule your appointment just like you schedule your other appointments here, at the  or over the phone. Each appointment will be 30 minutes long, and you will typically be seen every 2-4 weeks. Your insurance should cover the visit, but you may owe a specialist copay. If you would prefer to be seen by a therapist more frequently, or for a longer visit, please ask your Primary Care Provider for a recommendation.

## 2022-10-17 NOTE — PROGRESS NOTES
Patient: Tatiana Villegas is a 47 y.o. female who presents today with the following Chief Complaint(s):  Chief Complaint   Patient presents with    Pharyngitis     Onset one day ago with ST    Medication Check     Wants to go back on asthmanex       Assessment:  Encounter Diagnoses   Name Primary? Class 3 severe obesity with serious comorbidity and body mass index (BMI) of 45.0 to 49.9 in adult, unspecified obesity type (HCC) Yes    Sore throat     Chronic obstructive pulmonary disease, unspecified COPD type ([address]        Plan:  1. Class 3 severe obesity with serious comorbidity and body mass index (BMI) of 45.0 to 49.9 in adult, unspecified obesity type ([address]  Can try 1 more month of adipex. I encouraged patient to schedule with [facility] to help with weight loss and stress management. - phentermine (ADIPEX-P) 37.5 MG tablet; Take 1 tablet by mouth every morning (before breakfast) for 30 days. Dispense: 30 tablet; Refill: 0    2. Sore throat  Rapid strep is negative  - POCT rapid strep A    3. Chronic obstructive pulmonary disease, unspecified COPD type ([address]  Will put back on advair. Nebullizer order sent to [facility] on [address] . - fluticasone-salmeterol (ADVAIR) 250-50 MCG/ACT AEPB diskus inhaler; Inhale 1 puff into the lungs in the morning and 1 puff in the evening. Dispense: 60 each; Refill: 3  - DME Order for Nebulizer as OP      HPI  Patient presents today for follow up on weight loss. She states she is under a lot of stress. She is having difficulty loosing weight. She states she is working with the dietitian. She states when she has gone to figure weight loss in the past in [place] that she would loose weight just fine. She wants to try the adipex 1 more month to give it a full 3 months. BP is stable. She does have a sore throat today. She feels like she just has a cold. She had covid recently. She is requesting a new nebulizer machine for her COPD.  She also wants to go back on the advair she did not like the [facility]. Current Outpatient Medications   Medication Sig Dispense Refill    fluticasone-salmeterol (ADVAIR) 250-50 MCG/ACT AEPB diskus inhaler Inhale 1 puff into the lungs in the morning and 1 puff in the evening. 60 each 3    phentermine (ADIPEX-P) 37.5 MG tablet Take 1 tablet by mouth every morning (before breakfast) for 30 days. 30 tablet 0    albuterol sulfate HFA (VENTOLIN HFA) 108 (90 Base) MCG/ACT inhaler Inhale 2 puffs into the lungs 4 times daily as needed for Wheezing 54 g 1    melatonin 3 MG TABS tablet Take 3 mg by mouth in the morning. Apremilast 10 & 20 & 30 MG TBPK Day 1: 10 mg in morning,Day 2: 10 mg in morning and 10 mg in eveningDay 3: 10 mg in morning and 20 mg in evening,Day 4: 20 mg in morning and 20 mg in evening Day 5: 20 mg in morning and 30 mg in evening. Then 30 mg twice daily 55 each 0    ipratropium-albuterol (DUONEB) 0.5-2.5 (3) MG/3ML SOLN nebulizer solution Inhale 3 mLs into the lungs every 4 hours 360 mL 3    pantoprazole (PROTONIX) 40 MG tablet Take 1 tablet by mouth every morning (before breakfast) (Patient not taking: Reported on 10/17/2022) 30 tablet 5    calcipotriene (DOVONEX) 0.005 % cream Apply topically 2 times daily (Patient not taking: Reported on 10/17/2022) 120 g 4     No current facility-administered medications for this visit. Patient's past medical history, surgical history, family history, medications,and allergies  were all reviewed and updated as appropriate today. Review of Systems   Constitutional: Negative. HENT:  Positive for rhinorrhea and sore throat. Respiratory: Negative. Cardiovascular: Negative. Gastrointestinal: Negative. Musculoskeletal: Negative. Skin: Negative. Neurological: Negative. Physical Exam  Vitals reviewed. Cardiovascular:      Rate and Rhythm: Normal rate and regular rhythm. Heart sounds: Normal heart sounds.    Pulmonary:      Effort: Pulmonary effort is normal.      Breath sounds: Wheezing present. Comments: Minimal expiratory wheeze through out. Skin:     General: Skin is warm and dry. Neurological:      Mental Status: She is alert and oriented to person, place, and time. Vitals:    10/17/22 1413   BP: 126/86   Pulse: 74   SpO2: 97%       This chart was generated using the Dragon dictation system. I created this record but it may contain dictation errors due to the limitation of the software.

## 2022-10-19 ENCOUNTER — CARE COORDINATION (OUTPATIENT)
Dept: CARE COORDINATION | Age: 52
End: 2022-10-19

## 2022-10-19 NOTE — CARE COORDINATION
Contacted Lashanda Crespo regarding Dietitian referral. Patient answered and states she has been sick with URI. Patient requested RD call back next week. Will contact patient in one week and follow up as appropriate.        Yobany Silvestre, [address] [place] [address],    [phone]

## 2022-10-25 ENCOUNTER — CARE COORDINATION (OUTPATIENT)
Dept: CARE COORDINATION | Age: 52
End: 2022-10-25

## 2022-10-25 NOTE — CARE COORDINATION
Contacted Benjamín Thibodeaux regarding Dietitian follow up. Patient answered and states she is still sick with URI and that her mom passed away three days ago. Patient requested RD call back at a later time. Will contact patient in two weeks and follow up as appropriate.        Krish Lamar, [address] [place] [address],    [phone]

## 2022-11-08 ENCOUNTER — CARE COORDINATION (OUTPATIENT)
Dept: CARE COORDINATION | Age: 52
End: 2022-11-08

## 2022-11-23 ENCOUNTER — CARE COORDINATION (OUTPATIENT)
Dept: CARE COORDINATION | Age: 52
End: 2022-11-23

## 2022-11-23 NOTE — CARE COORDINATION
Contacted Brian Dove and left voicemail regarding Dietitian follow up. Left call back number and will follow up as appropriate.        Inez Piedra, [address] [place] [address], [place]  [phone]

## 2022-11-29 ENCOUNTER — COMMUNITY OUTREACH (OUTPATIENT)
Dept: FAMILY MEDICINE CLINIC | Age: 52
End: 2022-11-29

## 2022-11-29 NOTE — PROGRESS NOTES
Patient's HM shows they are overdue for [facility] and  files searched  without success.

## 2022-12-05 ENCOUNTER — CARE COORDINATION (OUTPATIENT)
Dept: CARE COORDINATION | Age: 52
End: 2022-12-05

## 2022-12-05 NOTE — CARE COORDINATION
Contacted Orlando Giles and left voicemail regarding Dietitian follow up. RD has attempted to call patient three times. Left call back number. RD will continue to follow/assist with patient return call.        Surekha Dewitt, [address] [place] [address],    [phone]

## 2023-04-24 ENCOUNTER — TELEPHONE (OUTPATIENT)
Dept: FAMILY MEDICINE CLINIC | Age: 53
End: 2023-04-24

## 2023-04-24 DIAGNOSIS — M54.50 ACUTE BILATERAL LOW BACK PAIN WITHOUT SCIATICA: ICD-10-CM

## 2023-04-24 RX ORDER — METHOCARBAMOL 750 MG/1
750 TABLET, FILM COATED ORAL 4 TIMES DAILY
Qty: 40 TABLET | Refills: 0 | Status: SHIPPED | OUTPATIENT
Start: 2023-04-24 | End: 2023-05-04

## 2023-04-24 NOTE — TELEPHONE ENCOUNTER
Pt states that the muscle relaxer makes her tired and is wondering if something else can be prescribed that does not make her tired. Pt is also wondering if she can get a handicap placard for work so that she does not have to walk far. Pt states that if she does not answer the phone it is okay to leave a message.  Please advise

## 2023-04-24 NOTE — TELEPHONE ENCOUNTER
Patient called because the steroids and muscle relaxer Katelynn gave her at her last appointment did help her with her back pain but now the pain is returning. She does work at [employer] and does lift a lot of heavy boxes. and her boss is trying to give her lighter things to lift. What else can she do for this?   Please advise  [phone]

## 2023-04-24 NOTE — TELEPHONE ENCOUNTER
I placed a referral to physical therapy, I also refilled the muscle relaxer.    [facility] [facility] [address] Srikanth Ybarra, [address], [address]  Call to schedule: [phone]  Fax: [phone]

## 2023-04-25 RX ORDER — DICLOFENAC SODIUM 75 MG/1
75 TABLET, DELAYED RELEASE ORAL 2 TIMES DAILY
Qty: 60 TABLET | Refills: 0 | Status: SHIPPED | OUTPATIENT
Start: 2023-04-25

## 2023-04-25 NOTE — TELEPHONE ENCOUNTER
I sent in some Diclofenac to take twice a day as needed. She needs to take it with food to help decrease upset stomach. I did order the handicap placard for 6 months.

## 2023-05-03 ENCOUNTER — HOSPITAL ENCOUNTER (OUTPATIENT)
Dept: PHYSICAL THERAPY | Age: 53
Setting detail: THERAPIES SERIES
Discharge: HOME | End: 2023-05-03

## 2023-05-03 NOTE — FLOWSHEET NOTE
[place] [address] [facility],  [address]        Physical Therapy  Cancellation/No-show Note  Patient Name:  Taylor Mathew  :  1970   Date:  5/3/2023  Cancelled visits to date: 0  No-shows to date: 1    For today's appointment patient:  []  Cancelled  []  Rescheduled appointment  [x]  No-show     Reason given by patient:  []  Patient ill  []  Conflicting appointment  []  No transportation    []  Conflict with work  []  No reason given  []  Other:     Comments:      Electronically signed by:  Marla Dash PT

## 2023-05-30 ENCOUNTER — COMMUNITY OUTREACH (OUTPATIENT)
Dept: FAMILY MEDICINE CLINIC | Age: 53
End: 2023-05-30

## 2023-10-15 DIAGNOSIS — J44.9 CHRONIC OBSTRUCTIVE PULMONARY DISEASE, UNSPECIFIED COPD TYPE (HCC): ICD-10-CM

## 2023-10-16 RX ORDER — FLUTICASONE PROPIONATE AND SALMETEROL 250; 50 UG/1; UG/1
POWDER RESPIRATORY (INHALATION)
Qty: 60 EACH | Refills: 5 | Status: SHIPPED | OUTPATIENT
Start: 2023-10-16

## 2024-04-16 ENCOUNTER — TELEPHONE (OUTPATIENT)
Dept: FAMILY MEDICINE CLINIC | Age: 54
End: 2024-04-16

## 2024-04-16 DIAGNOSIS — J44.9 CHRONIC OBSTRUCTIVE PULMONARY DISEASE, UNSPECIFIED COPD TYPE (HCC): ICD-10-CM

## 2024-04-16 NOTE — TELEPHONE ENCOUNTER
[facility] req refill for patient on  Flutic/salmeterol aer  Last visit 4/10/24  No future  [facility]

## 2024-04-22 ENCOUNTER — TELEPHONE (OUTPATIENT)
Dept: FAMILY MEDICINE CLINIC | Age: 54
End: 2024-04-22

## 2024-04-22 NOTE — TELEPHONE ENCOUNTER
[facility] req refill for patient on  Flutic/salmeter diskus inhaler  Last visit 4/10/23  No future  [facility]

## 2024-04-29 ENCOUNTER — TELEPHONE (OUTPATIENT)
Dept: FAMILY MEDICINE CLINIC | Age: 54
End: 2024-04-29

## 2024-04-29 NOTE — TELEPHONE ENCOUNTER
[facility] req refill for patient on  Albuterol Inhaler  Last visit 4/10/23  No future  [facility]

## 2024-05-02 ENCOUNTER — TELEPHONE (OUTPATIENT)
Dept: FAMILY MEDICINE CLINIC | Age: 54
End: 2024-05-02

## 2024-05-03 ENCOUNTER — TELEPHONE (OUTPATIENT)
Dept: FAMILY MEDICINE CLINIC | Age: 54
End: 2024-05-03

## 2024-05-03 NOTE — TELEPHONE ENCOUNTER
Last Office Visit  -  4-  Next Office Visit  -      Last Filled  -    Last UDS -    Contract -        Refill flutic/salmeter 250/50 mcg     Pharmacy [facility]

## 2024-10-14 ENCOUNTER — HOSPITAL ENCOUNTER (EMERGENCY)
Age: 54
Discharge: HOME | End: 2024-10-14
Payer: COMMERCIAL

## 2024-10-14 VITALS
SYSTOLIC BLOOD PRESSURE: 169 MMHG | HEART RATE: 88 BPM | HEIGHT: 65 IN | OXYGEN SATURATION: 96 % | BODY MASS INDEX: 48.82 KG/M2 | RESPIRATION RATE: 20 BRPM | TEMPERATURE: 98.1 F | DIASTOLIC BLOOD PRESSURE: 112 MMHG | WEIGHT: 293 LBS

## 2024-10-14 DIAGNOSIS — M79.604 RIGHT LEG PAIN: Primary | ICD-10-CM

## 2024-10-14 PROCEDURE — 99283 EMERGENCY DEPT VISIT LOW MDM: CPT

## 2024-10-14 RX ORDER — HYDROCODONE BITARTRATE AND ACETAMINOPHEN 5; 325 MG/1; MG/1
1 TABLET ORAL EVERY 6 HOURS PRN
Qty: 12 TABLET | Refills: 0 | Status: SHIPPED | OUTPATIENT
Start: 2024-10-14 | End: 2024-10-17

## 2024-10-14 RX ORDER — HYDROCODONE BITARTRATE AND ACETAMINOPHEN 5; 325 MG/1; MG/1
1 TABLET ORAL ONCE
Status: DISCONTINUED | OUTPATIENT
Start: 2024-10-14 | End: 2024-10-14 | Stop reason: HOSPADM

## 2024-10-14 ASSESSMENT — PAIN DESCRIPTION - LOCATION: LOCATION: LEG

## 2024-10-14 ASSESSMENT — PAIN DESCRIPTION - ORIENTATION: ORIENTATION: RIGHT

## 2024-10-14 ASSESSMENT — PAIN SCALES - GENERAL: PAINLEVEL_OUTOF10: 8

## 2024-10-14 ASSESSMENT — PAIN - FUNCTIONAL ASSESSMENT: PAIN_FUNCTIONAL_ASSESSMENT: 0-10

## 2024-10-15 NOTE — ED PROVIDER NOTES
[facility]  ED  EMERGENCY DEPARTMENT ENCOUNTER        Pt Name: Deysi Thurman  MRN: [id number]  Birthdate 1970  Date of evaluation: 10/14/2024  Provider: KENNA Nunez CNP  PCP: No primary care provider on file.        JOSE ANGEL. I have evaluated this patient.        CHIEF COMPLAINT       Chief Complaint   Patient presents with    Leg Pain     Patient fell at work on 10/6/2024.  Bruising and swelling to right leg. Saw workers comp doc today and was told to come to ED for DVT rule out.        HISTORY OF PRESENT ILLNESS: 1 or more Elements     History from : Patient    Limitations to history : None    Deysi Thurman is a 54 y.o. female who presents to the ER today with complaints of right leg pain.  Patient states that she had a fall on 10 6 in 2024 she saw her Worker's Comp. today today and wanted her to come to the ED to rule out a DVT.  Patient has multiple areas of bruising noted to her right thigh, she is complaining of pain in the area.  She is not anticoagulated.  Here for further evaluation.    Nursing Notes were all reviewed and agreed with or any disagreements were addressed in the HPI.    REVIEW OF SYSTEMS :      Review of Systems    Positives and Pertinent negatives as per HPI.     SURGICAL HISTORY     Past Surgical History:   Procedure Laterality Date    ANGIOPLASTY  2005    Normal    BRONCHOSCOPY  2012    pneumonia    CHOLECYSTECTOMY  2006    FOOT SURGERY Right 01/01/2002    remove neuroma    TUBAL LIGATION  01/01/2004    UPPER GASTROINTESTINAL ENDOSCOPY  2008    ? for GERD       CURRENTMEDICATIONS       Discharge Medication List as of 10/14/2024  5:40 PM        CONTINUE these medications which have NOT CHANGED    Details   fluticasone-salmeterol (ADVAIR) 250-50 MCG/ACT AEPB diskus inhaler INHALE 1 DOSE BY MOUTH IN THE MORNING AND 1 IN THE EVENING, Disp-60 each, R-5Normal      diclofenac (VOLTAREN) 75 MG EC tablet Take 1 tablet by mouth 2 times daily, Disp-60 tablet,